# Patient Record
Sex: FEMALE | Race: BLACK OR AFRICAN AMERICAN | Employment: FULL TIME | URBAN - METROPOLITAN AREA
[De-identification: names, ages, dates, MRNs, and addresses within clinical notes are randomized per-mention and may not be internally consistent; named-entity substitution may affect disease eponyms.]

---

## 2017-02-20 ENCOUNTER — OFFICE VISIT (OUTPATIENT)
Dept: INTERNAL MEDICINE CLINIC | Age: 52
End: 2017-02-20

## 2017-02-20 VITALS
WEIGHT: 228.3 LBS | SYSTOLIC BLOOD PRESSURE: 118 MMHG | RESPIRATION RATE: 18 BRPM | TEMPERATURE: 97.9 F | DIASTOLIC BLOOD PRESSURE: 82 MMHG | HEIGHT: 68 IN | HEART RATE: 75 BPM | BODY MASS INDEX: 34.6 KG/M2 | OXYGEN SATURATION: 97 %

## 2017-02-20 DIAGNOSIS — J30.1 SEASONAL ALLERGIC RHINITIS DUE TO POLLEN: Primary | ICD-10-CM

## 2017-02-20 RX ORDER — FLUTICASONE PROPIONATE 50 MCG
SPRAY, SUSPENSION (ML) NASAL
Qty: 1 BOTTLE | Refills: 1 | Status: SHIPPED | OUTPATIENT
Start: 2017-02-20 | End: 2017-09-18

## 2017-02-20 RX ORDER — LORATADINE 10 MG/1
10 TABLET ORAL DAILY
Qty: 30 TAB | Refills: 0 | Status: SHIPPED | OUTPATIENT
Start: 2017-02-20 | End: 2017-09-18

## 2017-02-20 NOTE — PROGRESS NOTES
Subjective:     Chief Complaint   Patient presents with    Cold Symptoms     headache, left earache, nasal congestion, slight cough x 6 days     She  is a 54y.o. year old female who presents for evaluation. Bad cold for past week with dry burning feeling in respiratory area, sneezing and ear hurting. Coughing and fatigue. Historical Data    Past Medical History   Diagnosis Date    Adverse effect of anesthesia      DELAYED AWAKENING    Cyst of skin      right side of face    Essential hypertension 2016     PT DENIES        Past Surgical History   Procedure Laterality Date    Hx partial hysterectomy      Hx other surgical  2016     PARATHYROIDECTOMY    Hx heent       POLYPS ON VOCAL CORDS    Hx hysterectomy      Hx  section          No outpatient encounter prescriptions on file as of 2017. No facility-administered encounter medications on file as of 2017. Allergies   Allergen Reactions    Latex Itching        Social History     Social History    Marital status: SINGLE     Spouse name: N/A    Number of children: N/A    Years of education: N/A     Occupational History    Not on file. Social History Main Topics    Smoking status: Current Every Day Smoker     Packs/day: 0.50     Types: Cigarettes    Smokeless tobacco: Never Used    Alcohol use 3.0 oz/week     6 Standard drinks or equivalent per week      Comment: OCCASIONAL    Drug use: No    Sexual activity: Yes     Partners: Male     Other Topics Concern    Not on file     Social History Narrative        Review of Systems  Pertinent items are noted in HPI. Objective:     Vitals:    17 0823   BP: 118/82   Pulse: 75   Resp: 18   Temp: 97.9 °F (36.6 °C)   TempSrc: Oral   SpO2: 97%   Weight: 228 lb 4.8 oz (103.6 kg)   Height: 5' 8\" (1.727 m)     Pleasant AAF in no acute distress. Ears:  TM's with slight bulging. Nose:  Turbinate hypertrophy. Throat: Clear. Lungs: Clear.     ASSESSMENT / PLAN: 1. Seasonal allergic rhinitis due to pollen  · Oral non-sedating antihistamine and nasal steroid. · Anticipatory guidance. Patient Instructions   Take Claritin 10 mg once daily. Use Flonase (or Nasonex) nasal spray, one spray to each nostril once daily. Follow-up Disposition:  Return if symptoms worsen or fail to improve. Advised her to call back or return to office if symptoms worsen/change/persist.  Discussed expected course/resolution/complications of diagnosis in detail with patient. Medication risks/benefits/costs/interactions/alternatives discussed with patient. She was given an after visit summary which includes diagnoses, current medications, & vitals. She expressed understanding with the diagnosis and plan.

## 2017-02-20 NOTE — PROGRESS NOTES
Corbin Ga is a 54 y.o. female  Chief Complaint   Patient presents with    Cold Symptoms     headache, left earache, nasal congestion, slight cough x 6 days     1. Have you been to the ER, urgent care clinic since your last visit? Hospitalized since your last visit? [facility] for cyst removal to right side of face 10/12/16. 2. Have you seen or consulted any other health care providers outside of the [address] [place] [address] since your last visit? Include any pap smears or colon screening.   No

## 2017-02-20 NOTE — LETTER
NOTIFICATION RETURN TO WORK / SCHOOL 
 
2/20/2017 8:37 AM 
 
 
Ms. Lin Aldana [address] [id number] To Whom It May Concern: 
 
Lin Aldana is currently under the care of Robbie Calderón. She will return to work/school on: 2/21/2017 If there are questions or concerns please have the patient contact our office. Sincerely, Farheen Joel MD

## 2017-02-20 NOTE — TELEPHONE ENCOUNTER
Pt seen in office, recommendations on AVS for claritin and flonase. Pt requesting RX so she can pay with [employer] card.

## 2017-02-20 NOTE — MR AVS SNAPSHOT
Visit Information Date & Time Provider Department Dept. Phone Encounter #  
 2/20/2017  8:15 AM Anmol Fan MD [place] [address] [facility] [phone] Follow-up Instructions Return if symptoms worsen or fail to improve. Upcoming Health Maintenance Date Due Pneumococcal 19-64 Medium Risk (1 of 1 - PPSV23) 10/11/2017* PAP AKA CERVICAL CYTOLOGY 6/1/2018 DTaP/Tdap/Td series (2 - Td) 10/3/2026 *Topic was postponed. The date shown is not the original due date. Allergies as of 2/20/2017  Review Complete On: 2/20/2017 By: Anmol Fan MD  
  
 Severity Noted Reaction Type Reactions Latex  02/27/2015    Itching Current Immunizations  Reviewed on 2/27/2015 Name Date Influenza Nasal Vaccine 11/15/2015, 11/1/2014 Tdap 10/3/2016 Not reviewed this visit You Were Diagnosed With   
  
 Codes Comments Seasonal allergic rhinitis due to pollen    -  Primary ICD-10-CM: J30.1 ICD-9-CM: 477.0 Vitals BP Pulse Temp Resp Height(growth percentile) Weight(growth percentile) 118/82 75 97.9 °F (36.6 °C) (Oral) 18 5' 8\" (1.727 m) 228 lb 4.8 oz (103.6 kg) SpO2 BMI OB Status Smoking Status 97% 34.71 kg/m2 Hysterectomy Current Every Day Smoker Vitals History BMI and BSA Data Body Mass Index Body Surface Area 34.71 kg/m 2 2.23 m 2 Preferred Pharmacy Pharmacy Name Phone [address] [phone] Your Updated Medication List  
  
Notice  As of 2/20/2017  8:38 AM  
 You have not been prescribed any medications. Follow-up Instructions Return if symptoms worsen or fail to improve. Patient Instructions Take Claritin 10 mg once daily. Use Flonase (or Nasonex) nasal spray, one spray to each nostril once daily. Introducing [address] Shayer Brooklynn Stage: Thank you for requesting a [employer] account. Our records indicate that you already have an active [employer] account. You can access your account anytime at [email or web address] Did you know that you can access your hospital and ER discharge instructions at any time in [employer]? You can also review all of your test results from your hospital stay or ER visit. Additional Information If you have questions, please visit the Frequently Asked Questions section of the [employer] website at [email or web address] Remember, [employer] is NOT to be used for urgent needs. For medical emergencies, dial 911. Now available from your iPhone and Android! Please provide this summary of care documentation to your next provider. Your primary care clinician is listed as Anny Chou. If you have any questions after today's visit, please call [phone].

## 2017-09-12 ENCOUNTER — HOSPITAL ENCOUNTER (OUTPATIENT)
Dept: MAMMOGRAPHY | Age: 52
Discharge: HOME | End: 2017-09-12
Payer: COMMERCIAL

## 2017-09-12 DIAGNOSIS — Z12.31 VISIT FOR SCREENING MAMMOGRAM: ICD-10-CM

## 2017-09-12 PROCEDURE — 77063 BREAST TOMOSYNTHESIS BI: CPT

## 2017-09-18 ENCOUNTER — OFFICE VISIT (OUTPATIENT)
Dept: INTERNAL MEDICINE CLINIC | Age: 52
End: 2017-09-18

## 2017-09-18 VITALS
RESPIRATION RATE: 16 BRPM | TEMPERATURE: 70 F | DIASTOLIC BLOOD PRESSURE: 80 MMHG | WEIGHT: 240.4 LBS | SYSTOLIC BLOOD PRESSURE: 130 MMHG | HEART RATE: 68 BPM | BODY MASS INDEX: 36.43 KG/M2 | HEIGHT: 68 IN | OXYGEN SATURATION: 98 %

## 2017-09-18 DIAGNOSIS — E66.09 NON MORBID OBESITY DUE TO EXCESS CALORIES: ICD-10-CM

## 2017-09-18 DIAGNOSIS — R20.0 NUMBNESS AND TINGLING IN BOTH HANDS: ICD-10-CM

## 2017-09-18 DIAGNOSIS — R20.2 NUMBNESS AND TINGLING IN BOTH HANDS: ICD-10-CM

## 2017-09-18 DIAGNOSIS — Z12.11 COLON CANCER SCREENING: ICD-10-CM

## 2017-09-18 DIAGNOSIS — Z00.00 WELL ADULT EXAM: ICD-10-CM

## 2017-09-18 DIAGNOSIS — B35.4 TINEA CORPORIS: Primary | ICD-10-CM

## 2017-09-18 DIAGNOSIS — R35.0 URINARY FREQUENCY: ICD-10-CM

## 2017-09-18 DIAGNOSIS — G56.03 BILATERAL CARPAL TUNNEL SYNDROME: ICD-10-CM

## 2017-09-18 RX ORDER — TRIAMCINOLONE ACETONIDE 1 MG/G
CREAM TOPICAL 2 TIMES DAILY
Qty: 15 G | Refills: 0 | Status: SHIPPED | OUTPATIENT
Start: 2017-09-18 | End: 2018-01-22

## 2017-09-18 RX ORDER — NYSTATIN 100000 U/G
CREAM TOPICAL 2 TIMES DAILY
Qty: 15 G | Refills: 0 | Status: SHIPPED | OUTPATIENT
Start: 2017-09-18 | End: 2018-01-22

## 2017-09-18 NOTE — MR AVS SNAPSHOT
Visit Information Date & Time Provider Department Dept. Phone Encounter #  
 9/18/2017  9:15 AM Estuardo Felix MD [place] [address] [facility] [phone] [id number] Follow-up Instructions Return in about 6 weeks (around 10/30/2017) for F/U weight and labs. Upcoming Health Maintenance Date Due FOBT Q 1 YEAR AGE 50-75 7/30/2017 INFLUENZA AGE 9 TO ADULT 8/1/2017 Pneumococcal 19-64 Medium Risk (1 of 1 - PPSV23) 10/11/2017* PAP AKA CERVICAL CYTOLOGY 6/1/2018 BREAST CANCER SCRN MAMMOGRAM 9/12/2019 DTaP/Tdap/Td series (2 - Td) 10/3/2026 *Topic was postponed. The date shown is not the original due date. Allergies as of 9/18/2017  Review Complete On: 9/18/2017 By: Estuardo Felix MD  
  
 Severity Noted Reaction Type Reactions Latex  02/27/2015    Itching Current Immunizations  Reviewed on 2/27/2015 Name Date Influenza Nasal Vaccine 11/15/2015, 11/1/2014 Tdap 10/3/2016 Not reviewed this visit You Were Diagnosed With   
  
 Codes Comments Tinea corporis    -  Primary ICD-10-CM: B35.4 ICD-9-CM: 110.5 Urinary frequency     ICD-10-CM: R35.0 ICD-9-CM: 788.41 Bilateral carpal tunnel syndrome     ICD-10-CM: G56.03 
ICD-9-CM: 354.0 Numbness and tingling in both hands     ICD-10-CM: R20.0, R20.2 ICD-9-CM: 782.0 Colon cancer screening     ICD-10-CM: Z12.11 ICD-9-CM: V76.51 Well adult exam     ICD-10-CM: Z00.00 ICD-9-CM: V70.0 Non morbid obesity due to excess calories     ICD-10-CM: E66.09 
ICD-9-CM: 278.00 Vitals BP Pulse Temp Resp Height(growth percentile) Weight(growth percentile) 130/80 (BP 1 Location: Left arm, BP Patient Position: Sitting) 68 (!) 70 °F (21.1 °C) 16 5' 8\" (1.727 m) 240 lb 6.4 oz (109 kg) SpO2 BMI OB Status Smoking Status 98% 36.55 kg/m2 Hysterectomy Current Every Day Smoker BMI and BSA Data  Body Mass Index Body Surface Area  
 36.55 kg/m 2 2.29 m 2  
  
 [facility]erred [facility] [address] , [address] [place] [address] [phone] Your Updated Medication List  
  
   
This list is accurate as of: 9/18/17  9:45 AM.  Always use your most recent med list.  
  
  
  
  
 nystatin topical cream  
Commonly known as:  MYCOSTATIN Apply  to affected area two (2) times a day. triamcinolone acetonide 0.1 % topical cream  
Commonly known as:  KENALOG Apply  to affected area two (2) times a day. use thin layer Prescriptions Sent to Pharmacy Refills  
 nystatin (MYCOSTATIN) topical cream 0 Sig: Apply  to affected area two (2) times a day. Class: Normal  
 Pharmacy: [place] [id number] [facility], [address] [place] [address] Ph #: [phone] Route: Topical  
 triamcinolone acetonide (KENALOG) 0.1 % topical cream 0 Sig: Apply  to affected area two (2) times a day. use thin layer Class: Normal  
 Pharmacy: [place] [id number] [facility], [address] [place] [address] Ph #: [phone] Route: Topical  
  
We Performed the Following REFERRAL TO GASTROENTEROLOGY [MZD81 Custom] Follow-up Instructions Return in about 6 weeks (around 10/30/2017) for F/U weight and labs. To-Do List   
 09/18/2017 Lab:  CBC WITH AUTOMATED DIFF   
  
 09/18/2017 Lab:  HEMOGLOBIN A1C WITH EAG   
  
 09/18/2017 Lab:  LIPID PANEL   
  
 09/18/2017 Lab:  METABOLIC PANEL, COMPREHENSIVE   
  
 09/18/2017 Lab:  TSH REFLEX TO T4 Referral Information Referral ID Referred By Referred To  
  
 [id number] [facility] [address] [place] [address] [phone] Visits Status Start Date End Date 1 New Request 9/18/17 9/18/18 If your referral has a status of pending review or denied, additional information will be sent to support the outcome of this decision. Patient Instructions Follow a no concentrated sweets, calorie controlled diet. Exercise for at least 30 minutes daily. Have a colonoscopy screening done. Do carpal tunnel stretches daily. Carpal Tunnel Syndrome: Exercises Your Care Instructions Here are some examples of typical rehabilitation exercises for your condition. Start each exercise slowly. Ease off the exercise if you start to have pain. Your doctor or your physical or occupational therapist will tell you when you can start these exercises and which ones will work best for you. How to do the exercises Note: When you no longer have pain or numbness, you can do exercises to help prevent carpal tunnel syndrome from coming back. Do not do any stretch or movement that is uncomfortable or painful. Warm-up stretches 1. Rotate your wrist up, down, and from side to side. Repeat 4 times. 2. Stretch your fingers far apart. Relax them, and then stretch them again. Repeat 4 times. 3. Stretch your thumb by pulling it back gently, holding it, and then releasing it. Repeat 4 times. Prayer stretch 1. Start with your palms together in front of your chest just below your chin. 2. Slowly lower your hands toward your waistline, keeping your hands close to your stomach and your palms together until you feel a mild to moderate stretch under your forearms. 3. Hold for at least 15 to 30 seconds. Repeat 2 to 4 times. Wrist flexor stretch 1. Extend your arm in front of you with your palm up. 2. Bend your wrist, pointing your hand toward the floor. 3. With your other hand, gently bend your wrist farther until you feel a mild to moderate stretch in your forearm. 4. Hold for at least 15 to 30 seconds. Repeat 2 to 4 times. Wrist extensor stretch Repeat steps 1 through 4 of the stretch above, but begin with your extended hand palm down. Follow-up care is a key part of your treatment and safety.  Be sure to make and go to all appointments, and call your doctor if you are having problems. It's also a good idea to know your test results and keep a list of the medicines you take. Where can you learn more? Go to http://cecelia-miah.info/. Enter L993 in the search box to learn more about \"Carpal Tunnel Syndrome: Exercises. \" Current as of: March 21, 2017 Content Version: 11.3 © 2655-9429 [employer]. Care instructions adapted under license by [employer] (which disclaims liability or warranty for this information). If you have questions about a medical condition or this instruction, always ask your healthcare professional. [address] any warranty or liability for your use of this information. Starting a Weight Loss Plan: Care Instructions Your Care Instructions If you are thinking about losing weight, it can be hard to know where to start. Your doctor can help you set up a weight loss plan that best meets your needs. You may want to take a class on nutrition or exercise, or join a weight loss support group. If you have questions about how to make changes to your eating or exercise habits, ask your doctor about seeing a registered dietitian or an exercise specialist. 
It can be a big challenge to lose weight. But you do not have to make huge changes at once. Make small changes, and stick with them. When those changes become habit, add a few more changes. If you do not think you are ready to make changes right now, try to pick a date in the future. Make an appointment to see your doctor to discuss whether the time is right for you to start a plan. Follow-up care is a key part of your treatment and safety. Be sure to make and go to all appointments, and call your doctor if you are having problems. Its also a good idea to know your test results and keep a list of the medicines you take. How can you care for yourself at home? · Set realistic goals. Many people expect to lose much more weight than is likely. A weight loss of 5% to 10% of your body weight may be enough to improve your health. · Get family and friends involved to provide support. Talk to them about why you are trying to lose weight, and ask them to help. They can help by participating in exercise and having meals with you, even if they may be eating something different. · Find what works best for you. If you do not have time or do not like to cook, a program that offers meal replacement bars or shakes may be better for you. Or if you like to prepare meals, finding a plan that includes daily menus and recipes may be best. 
· Ask your doctor about other health professionals who can help you achieve your weight loss goals. ¨ A dietitian can help you make healthy changes in your diet. ¨ An exercise specialist or  can help you develop a safe and effective exercise program. 
¨ A counselor or psychiatrist can help you cope with issues such as depression, anxiety, or family problems that can make it hard to focus on weight loss. · Consider joining a support group for people who are trying to lose weight. Your doctor can suggest groups in your area. Where can you learn more? Go to http://cecelia-miah.info/. Enter K452 in the search box to learn more about \"Starting a Weight Loss Plan: Care Instructions. \" Current as of: October 13, 2016 Content Version: 11.3 © 3421-1950 [employer]. Care instructions adapted under license by [employer] (which disclaims liability or warranty for this information). If you have questions about a medical condition or this instruction, always ask your healthcare professional. [place] [address] any warranty or liability for your use of this information. Introducing [address] Dear Lamin Bagley: Thank you for requesting a [employer] account. Our records indicate that you already have an active [employer] account. You can access your account anytime at [email or web address] Did you know that you can access your hospital and ER discharge instructions at any time in [employer]? You can also review all of your test results from your hospital stay or ER visit. Additional Information If you have questions, please visit the Frequently Asked Questions section of the [employer] website at [email or web address] Remember, [employer] is NOT to be used for urgent needs. For medical emergencies, dial 911. Now available from your iPhone and Android! Please provide this summary of care documentation to your next provider. Your primary care clinician is listed as Beto Brea. If you have any questions after today's visit, please call [phone].

## 2017-09-18 NOTE — PATIENT INSTRUCTIONS
Follow a no concentrated sweets, calorie controlled diet. Exercise for at least 30 minutes daily. Have a colonoscopy screening done. Do carpal tunnel stretches daily. Carpal Tunnel Syndrome: Exercises  Your Care Instructions  Here are some examples of typical rehabilitation exercises for your condition. Start each exercise slowly. Ease off the exercise if you start to have pain. Your doctor or your physical or occupational therapist will tell you when you can start these exercises and which ones will work best for you. How to do the exercises  Note: When you no longer have pain or numbness, you can do exercises to help prevent carpal tunnel syndrome from coming back. Do not do any stretch or movement that is uncomfortable or painful. Warm-up stretches  1. Rotate your wrist up, down, and from side to side. Repeat 4 times. 2. Stretch your fingers far apart. Relax them, and then stretch them again. Repeat 4 times. 3. Stretch your thumb by pulling it back gently, holding it, and then releasing it. Repeat 4 times. Prayer stretch    1. Start with your palms together in front of your chest just below your chin. 2. Slowly lower your hands toward your waistline, keeping your hands close to your stomach and your palms together until you feel a mild to moderate stretch under your forearms. 3. Hold for at least 15 to 30 seconds. Repeat 2 to 4 times. Wrist flexor stretch    1. Extend your arm in front of you with your palm up. 2. Bend your wrist, pointing your hand toward the floor. 3. With your other hand, gently bend your wrist farther until you feel a mild to moderate stretch in your forearm. 4. Hold for at least 15 to 30 seconds. Repeat 2 to 4 times. Wrist extensor stretch    Repeat steps 1 through 4 of the stretch above, but begin with your extended hand palm down. Follow-up care is a key part of your treatment and safety.  Be sure to make and go to all appointments, and call your doctor if you are having problems. It's also a good idea to know your test results and keep a list of the medicines you take. Where can you learn more? Go to http://cecelia-miah.info/. Enter N085 in the search box to learn more about \"Carpal Tunnel Syndrome: Exercises. \"  Current as of: March 21, 2017  Content Version: 11.3  © 5258-0253 [employer]. Care instructions adapted under license by [employer] (which disclaims liability or warranty for this information). If you have questions about a medical condition or this instruction, always ask your healthcare professional. [place] [address] any warranty or liability for your use of this information. Starting a Weight Loss Plan: Care Instructions  Your Care Instructions  If you are thinking about losing weight, it can be hard to know where to start. Your doctor can help you set up a weight loss plan that best meets your needs. You may want to take a class on nutrition or exercise, or join a weight loss support group. If you have questions about how to make changes to your eating or exercise habits, ask your doctor about seeing a registered dietitian or an exercise specialist.  It can be a big challenge to lose weight. But you do not have to make huge changes at once. Make small changes, and stick with them. When those changes become habit, add a few more changes. If you do not think you are ready to make changes right now, try to pick a date in the future. Make an appointment to see your doctor to discuss whether the time is right for you to start a plan. Follow-up care is a key part of your treatment and safety. Be sure to make and go to all appointments, and call your doctor if you are having problems. Its also a good idea to know your test results and keep a list of the medicines you take. How can you care for yourself at home? · Set realistic goals. Many people expect to lose much more weight than is likely.  A weight loss of 5% to 10% of your body weight may be enough to improve your health. · Get family and friends involved to provide support. Talk to them about why you are trying to lose weight, and ask them to help. They can help by participating in exercise and having meals with you, even if they may be eating something different. · Find what works best for you. If you do not have time or do not like to cook, a program that offers meal replacement bars or shakes may be better for you. Or if you like to prepare meals, finding a plan that includes daily menus and recipes may be best.  · Ask your doctor about other health professionals who can help you achieve your weight loss goals. ¨ A dietitian can help you make healthy changes in your diet. ¨ An exercise specialist or  can help you develop a safe and effective exercise program.  ¨ A counselor or psychiatrist can help you cope with issues such as depression, anxiety, or family problems that can make it hard to focus on weight loss. · Consider joining a support group for people who are trying to lose weight. Your doctor can suggest groups in your area. Where can you learn more? Go to http://cecelia-miah.info/. Enter O741 in the search box to learn more about \"Starting a Weight Loss Plan: Care Instructions. \"  Current as of: October 13, 2016  Content Version: 11.3  © 4487-6022 [employer]. Care instructions adapted under license by [employer] (which disclaims liability or warranty for this information). If you have questions about a medical condition or this instruction, always ask your healthcare professional. [place] [address] any warranty or liability for your use of this information.

## 2017-09-18 NOTE — PROGRESS NOTES
Chief Complaint   Patient presents with    Foot Pain     right    Tingling     both hand    Urinary Frequency     at night     Reviewed record in preparation for visit and have obtained necessary documentation. Identified pt with two pt identifiers(name and ). Health Maintenance Due   Topic    FOBT Q 1 YEAR AGE 50-75     INFLUENZA AGE 9 TO ADULT          Chief Complaint   Patient presents with    Foot Pain     right    Tingling     both hand    Urinary Frequency     at night        Wt Readings from Last 3 Encounters:   17 240 lb 6.4 oz (109 kg)   17 228 lb 4.8 oz (103.6 kg)   10/12/16 222 lb (100.7 kg)     Temp Readings from Last 3 Encounters:   17 (!) 70 °F (21.1 °C)   17 97.9 °F (36.6 °C) (Oral)   10/12/16 97.7 °F (36.5 °C)     BP Readings from Last 3 Encounters:   17 130/80   17 118/82   10/12/16 142/76     Pulse Readings from Last 3 Encounters:   17 68   17 75   10/12/16 67           Learning Assessment:  :     Learning Assessment 10/12/2015 2015   PRIMARY LEARNER Patient Patient   HIGHEST LEVEL OF EDUCATION - PRIMARY LEARNER  DID NOT GRADUATE HIGH SCHOOL DID NOT GRADUATE HIGH SCHOOL   BARRIERS PRIMARY LEARNER NONE NONE   CO-LEARNER CAREGIVER No No   PRIMARY LANGUAGE ENGLISH ENGLISH    NEED No -   LEARNER PREFERENCE PRIMARY LISTENING DEMONSTRATION   LEARNING SPECIAL TOPICS no -   ANSWERED BY patient patient   RELATIONSHIP SELF SELF       Depression Screening:  :     PHQ over the last two weeks 2017   Little interest or pleasure in doing things Several days   Feeling down, depressed or hopeless Several days   Total Score PHQ 2 2       Fall Risk Assessment:  :     No flowsheet data found. Abuse Screening:  :     Abuse Screening Questionnaire 10/12/2015 2015   Do you ever feel afraid of your partner? N N   Are you in a relationship with someone who physically or mentally threatens you?  N N   Is it safe for you to go home? Catracho Manley       Coordination of Care Questionnaire:  :     1) Have you been to an emergency room, urgent care clinic since your last visit? no   Hospitalized since your last visit? no             2) Have you seen or consulted any other health care providers outside of [address] [place] [address] since your last visit? no  (Include any pap smears or colon screenings in this section.)    3) Do you have an Advance Directive on file? no    4) Are you interested in receiving information on Advance Directives? NO      Patient is accompanied by self I have received verbal consent from Aris Greenfield to discuss any/all medical information while they are present in the room. Reviewed record  In preparation for visit and have obtained necessary documentation.

## 2017-09-18 NOTE — PROGRESS NOTES
Subjective:     Chief Complaint   Patient presents with    Foot Pain     right    Tingling     both hand    Urinary Frequency     at night     She  is a 50y.o. year old female who presents for evaluation. Shooting pains in right foot. Has numbness in both hands, especially when holding book. Has urinary frequency (night). Has gained weight. Strong history of kidney disease. Historical Data    Past Medical History:   Diagnosis Date    Adverse effect of anesthesia     DELAYED AWAKENING    Cyst of skin     right side of face    Essential hypertension 2016    PT DENIES        Past Surgical History:   Procedure Laterality Date    HX  SECTION      HX HEENT      POLYPS ON VOCAL CORDS    HX HYSTERECTOMY      HX OTHER SURGICAL  2016    PARATHYROIDECTOMY    HX PARTIAL HYSTERECTOMY          Outpatient Encounter Prescriptions as of 2017   Medication Sig Dispense Refill    [DISCONTINUED] loratadine (CLARITIN) 10 mg tablet Take 1 Tab by mouth daily. 30 Tab 0    [DISCONTINUED] fluticasone (FLONASE) 50 mcg/actuation nasal spray Use one spray each nostril once daily 1 Bottle 1     No facility-administered encounter medications on file as of 2017. Allergies   Allergen Reactions    Latex Itching        Social History     Social History    Marital status: SINGLE     Spouse name: N/A    Number of children: N/A    Years of education: N/A     Occupational History    Not on file.      Social History Main Topics    Smoking status: Current Every Day Smoker     Packs/day: 0.50     Types: Cigarettes    Smokeless tobacco: Never Used    Alcohol use 3.0 oz/week     6 Standard drinks or equivalent per week      Comment: OCCASIONAL    Drug use: No    Sexual activity: Yes     Partners: Male     Other Topics Concern    Not on file     Social History Narrative          Review of Systems  A comprehensive review of systems was negative except for that written in the HPI.    Objective:     Vitals:    09/18/17 0926   BP: 130/80   Pulse: 68   Resp: 16   Temp: (!) 70 °F (21.1 °C)   SpO2: 98%   Weight: 240 lb 6.4 oz (109 kg)   Height: 5' 8\" (1.727 m)     Pleasant AAF in no acute distress. Mood:  Slightly despondent? Neck:  Supple. No masses. Cardiac: RRR without murmurs, gallops or rubs. Lungs: Clear to auscultation. Abdomen: Benign. Neuro:  Intact    ASSESSMENT / PLAN:   1. Tinea corporis    - nystatin (MYCOSTATIN) topical cream; Apply  to affected area two (2) times a day. Dispense: 15 g; Refill: 0  - triamcinolone acetonide (KENALOG) 0.1 % topical cream; Apply  to affected area two (2) times a day. use thin layer  Dispense: 15 g; Refill: 0    2. Urinary frequency  · Check for any evidence of DM.  - HEMOGLOBIN A1C WITH EAG; Future  - HEMOGLOBIN A1C WITH EAG    3. Bilateral carpal tunnel syndrome  · Check BS, TSH, etc.  · Do carpal tunnel stretches. 4. Numbness and tingling in both hands  · As above. - HEMOGLOBIN A1C WITH EAG; Future  - HEMOGLOBIN A1C WITH EAG    5. Colon cancer screening    - REFERRAL TO GASTROENTEROLOGY    6. Well adult exam    - CBC WITH AUTOMATED DIFF; Future  - LIPID PANEL; Future  - TSH REFLEX TO T4; Future  - METABOLIC PANEL, COMPREHENSIVE; Future  - CBC WITH AUTOMATED DIFF  - LIPID PANEL  - TSH REFLEX TO T4  - METABOLIC PANEL, COMPREHENSIVE    7. Non morbid obesity due to excess calories  I have reviewed/discussed the above normal BMI with the patient. I have recommended the following interventions: dietary management education, guidance, and counseling . Teresa Valencia Patient Instructions   Follow a no concentrated sweets, calorie controlled diet. Exercise for at least 30 minutes daily. Have a colonoscopy screening done. Do carpal tunnel stretches daily. Carpal Tunnel Syndrome: Exercises  Your Care Instructions  Here are some examples of typical rehabilitation exercises for your condition. Start each exercise slowly.  Ease off the exercise if you start to have pain. Your doctor or your physical or occupational therapist will tell you when you can start these exercises and which ones will work best for you. How to do the exercises  Note: When you no longer have pain or numbness, you can do exercises to help prevent carpal tunnel syndrome from coming back. Do not do any stretch or movement that is uncomfortable or painful. Warm-up stretches  2. Rotate your wrist up, down, and from side to side. Repeat 4 times. 3. Stretch your fingers far apart. Relax them, and then stretch them again. Repeat 4 times. 4. Stretch your thumb by pulling it back gently, holding it, and then releasing it. Repeat 4 times. Prayer stretch    3. Start with your palms together in front of your chest just below your chin. 4. Slowly lower your hands toward your waistline, keeping your hands close to your stomach and your palms together until you feel a mild to moderate stretch under your forearms. 5. Hold for at least 15 to 30 seconds. Repeat 2 to 4 times. Wrist flexor stretch    2. Extend your arm in front of you with your palm up. 3. Bend your wrist, pointing your hand toward the floor. 4. With your other hand, gently bend your wrist farther until you feel a mild to moderate stretch in your forearm. 5. Hold for at least 15 to 30 seconds. Repeat 2 to 4 times. Wrist extensor stretch    Repeat steps 1 through 4 of the stretch above, but begin with your extended hand palm down. Follow-up care is a key part of your treatment and safety. Be sure to make and go to all appointments, and call your doctor if you are having problems. It's also a good idea to know your test results and keep a list of the medicines you take. Where can you learn more? Go to http://cecelia-miah.info/. Enter Y184 in the search box to learn more about \"Carpal Tunnel Syndrome: Exercises. \"  Current as of: March 21, 2017  Content Version: 11.3  © 9512-2669 [employer]. Care instructions adapted under license by [employer] (which disclaims liability or warranty for this information). If you have questions about a medical condition or this instruction, always ask your healthcare professional. [address] any warranty or liability for your use of this information. Starting a Weight Loss Plan: Care Instructions  Your Care Instructions  If you are thinking about losing weight, it can be hard to know where to start. Your doctor can help you set up a weight loss plan that best meets your needs. You may want to take a class on nutrition or exercise, or join a weight loss support group. If you have questions about how to make changes to your eating or exercise habits, ask your doctor about seeing a registered dietitian or an exercise specialist.  It can be a big challenge to lose weight. But you do not have to make huge changes at once. Make small changes, and stick with them. When those changes become habit, add a few more changes. If you do not think you are ready to make changes right now, try to pick a date in the future. Make an appointment to see your doctor to discuss whether the time is right for you to start a plan. Follow-up care is a key part of your treatment and safety. Be sure to make and go to all appointments, and call your doctor if you are having problems. Its also a good idea to know your test results and keep a list of the medicines you take. How can you care for yourself at home? · Set realistic goals. Many people expect to lose much more weight than is likely. A weight loss of 5% to 10% of your body weight may be enough to improve your health. · Get family and friends involved to provide support. Talk to them about why you are trying to lose weight, and ask them to help. They can help by participating in exercise and having meals with you, even if they may be eating something different.   · Find what works best for you. If you do not have time or do not like to cook, a program that offers meal replacement bars or shakes may be better for you. Or if you like to prepare meals, finding a plan that includes daily menus and recipes may be best.  · Ask your doctor about other health professionals who can help you achieve your weight loss goals. ¨ A dietitian can help you make healthy changes in your diet. ¨ An exercise specialist or  can help you develop a safe and effective exercise program.  ¨ A counselor or psychiatrist can help you cope with issues such as depression, anxiety, or family problems that can make it hard to focus on weight loss. · Consider joining a support group for people who are trying to lose weight. Your doctor can suggest groups in your area. Where can you learn more? Go to http://cecelia-miah.info/. Enter F272 in the search box to learn more about \"Starting a Weight Loss Plan: Care Instructions. \"  Current as of: October 13, 2016  Content Version: 11.3  © 0887-0113 [employer]. Care instructions adapted under license by [employer] (which disclaims liability or warranty for this information). If you have questions about a medical condition or this instruction, always ask your healthcare professional. [place] [address] any warranty or liability for your use of this information. Follow-up Disposition:  Return in about 6 weeks (around 10/30/2017) for F/U weight and labs. Advised her to call back or return to office if symptoms worsen/change/persist.  Discussed expected course/resolution/complications of diagnosis in detail with patient. Medication risks/benefits/costs/interactions/alternatives discussed with patient. She was given an after visit summary which includes diagnoses, current medications, & vitals. She expressed understanding with the diagnosis and plan.

## 2017-09-20 LAB
ALBUMIN SERPL-MCNC: 4.1 G/DL (ref 3.5–5.5)
ALBUMIN/GLOB SERPL: 1.5 {RATIO} (ref 1.2–2.2)
ALP SERPL-CCNC: 97 IU/L (ref 39–117)
ALT SERPL-CCNC: 12 IU/L (ref 0–32)
AST SERPL-CCNC: 15 IU/L (ref 0–40)
BASOPHILS # BLD AUTO: 0 X10E3/UL (ref 0–0.2)
BASOPHILS NFR BLD AUTO: 0 %
BILIRUB SERPL-MCNC: 0.3 MG/DL (ref 0–1.2)
BUN SERPL-MCNC: 16 MG/DL (ref 6–24)
BUN/CREAT SERPL: 35 (ref 9–23)
CALCIUM SERPL-MCNC: 8.8 MG/DL (ref 8.7–10.2)
CHLORIDE SERPL-SCNC: 106 MMOL/L (ref 96–106)
CHOLEST SERPL-MCNC: 144 MG/DL (ref 100–199)
CO2 SERPL-SCNC: 26 MMOL/L (ref 18–29)
CREAT SERPL-MCNC: 0.46 MG/DL (ref 0.57–1)
EOSINOPHIL # BLD AUTO: 0.1 X10E3/UL (ref 0–0.4)
EOSINOPHIL NFR BLD AUTO: 2 %
ERYTHROCYTE [DISTWIDTH] IN BLOOD BY AUTOMATED COUNT: 12.7 % (ref 12.3–15.4)
EST. AVERAGE GLUCOSE BLD GHB EST-MCNC: 97 MG/DL
GLOBULIN SER CALC-MCNC: 2.8 G/DL (ref 1.5–4.5)
GLUCOSE SERPL-MCNC: 81 MG/DL (ref 65–99)
HBA1C MFR BLD: 5 % (ref 4.8–5.6)
HCT VFR BLD AUTO: 36.1 % (ref 34–46.6)
HDLC SERPL-MCNC: 55 MG/DL
HGB BLD-MCNC: 11.7 G/DL (ref 11.1–15.9)
IMM GRANULOCYTES # BLD: 0 X10E3/UL (ref 0–0.1)
IMM GRANULOCYTES NFR BLD: 0 %
INTERPRETATION, 910389: NORMAL
LDLC SERPL CALC-MCNC: 80 MG/DL (ref 0–99)
LYMPHOCYTES # BLD AUTO: 2.5 X10E3/UL (ref 0.7–3.1)
LYMPHOCYTES NFR BLD AUTO: 33 %
MCH RBC QN AUTO: 32 PG (ref 26.6–33)
MCHC RBC AUTO-ENTMCNC: 32.4 G/DL (ref 31.5–35.7)
MCV RBC AUTO: 99 FL (ref 79–97)
MONOCYTES # BLD AUTO: 0.7 X10E3/UL (ref 0.1–0.9)
MONOCYTES NFR BLD AUTO: 9 %
NEUTROPHILS # BLD AUTO: 4.3 X10E3/UL (ref 1.4–7)
NEUTROPHILS NFR BLD AUTO: 56 %
PLATELET # BLD AUTO: 206 X10E3/UL (ref 150–379)
POTASSIUM SERPL-SCNC: 4 MMOL/L (ref 3.5–5.2)
PROT SERPL-MCNC: 6.9 G/DL (ref 6–8.5)
RBC # BLD AUTO: 3.66 X10E6/UL (ref 3.77–5.28)
SODIUM SERPL-SCNC: 144 MMOL/L (ref 134–144)
TRIGL SERPL-MCNC: 47 MG/DL (ref 0–149)
TSH SERPL DL<=0.005 MIU/L-ACNC: 2.33 UIU/ML (ref 0.45–4.5)
VLDLC SERPL CALC-MCNC: 9 MG/DL (ref 5–40)
WBC # BLD AUTO: 7.7 X10E3/UL (ref 3.4–10.8)

## 2017-10-30 ENCOUNTER — OFFICE VISIT (OUTPATIENT)
Dept: INTERNAL MEDICINE CLINIC | Age: 52
End: 2017-10-30

## 2017-10-30 VITALS
OXYGEN SATURATION: 97 % | TEMPERATURE: 97.5 F | DIASTOLIC BLOOD PRESSURE: 80 MMHG | HEIGHT: 68 IN | SYSTOLIC BLOOD PRESSURE: 140 MMHG | HEART RATE: 62 BPM | RESPIRATION RATE: 16 BRPM | BODY MASS INDEX: 37.6 KG/M2 | WEIGHT: 248.1 LBS

## 2017-10-30 DIAGNOSIS — L60.3 ONYCHODYSTROPHY: Primary | ICD-10-CM

## 2017-10-30 DIAGNOSIS — E66.09 CLASS 2 OBESITY DUE TO EXCESS CALORIES WITHOUT SERIOUS COMORBIDITY WITH BODY MASS INDEX (BMI) OF 37.0 TO 37.9 IN ADULT: ICD-10-CM

## 2017-10-30 NOTE — PROGRESS NOTES
Subjective:     Chief Complaint   Patient presents with    Results     She  is a 50y.o. year old female who presents for evaluation. Weight is still going up. Colonoscopy is on the . Has some splitting nails. Historical Data    Past Medical History:   Diagnosis Date    Adverse effect of anesthesia     DELAYED AWAKENING    Cyst of skin     right side of face    Essential hypertension 2016    PT DENIES        Past Surgical History:   Procedure Laterality Date    HX  SECTION      HX HEENT      POLYPS ON VOCAL CORDS    HX HYSTERECTOMY      HX OTHER SURGICAL  2016    PARATHYROIDECTOMY    HX PARTIAL HYSTERECTOMY          Outpatient Encounter Prescriptions as of 10/30/2017   Medication Sig Dispense Refill    nystatin (MYCOSTATIN) topical cream Apply  to affected area two (2) times a day. 15 g 0    triamcinolone acetonide (KENALOG) 0.1 % topical cream Apply  to affected area two (2) times a day. use thin layer 15 g 0     No facility-administered encounter medications on file as of 10/30/2017. Allergies   Allergen Reactions    Latex Itching        Social History     Social History    Marital status: SINGLE     Spouse name: N/A    Number of children: N/A    Years of education: N/A     Occupational History    Not on file. Social History Main Topics    Smoking status: Current Every Day Smoker     Packs/day: 0.50     Types: Cigarettes    Smokeless tobacco: Never Used    Alcohol use 3.0 oz/week     6 Standard drinks or equivalent per week      Comment: OCCASIONAL    Drug use: No    Sexual activity: Yes     Partners: Male     Other Topics Concern    Not on file     Social History Narrative        Review of Systems  A comprehensive review of systems was negative except for that written in the HPI.     Objective:     Vitals:    10/30/17 0901   BP: 140/80   Pulse: 62   Resp: 16   Temp: 97.5 °F (36.4 °C)   TempSrc: Oral   SpO2: 97%   Weight: 248 lb 1.6 oz (112.5 kg)   Height: 5' 8\" (1.727 m)     Pleasant AAF in no acute distress. Toenails with thickening and splitting. ASSESSMENT / PLAN:   1. Onychodystrophy    - REFERRAL TO PODIATRY    2. Class 2 obesity due to excess calories without serious comorbidity with body mass index (BMI) of 37.0 to 37.9 in adult  I have reviewed/discussed the above normal BMI with the patient. I have recommended the following interventions: dietary management education, guidance, and counseling . Lauren Frederick Patient Instructions   Follow a calorie controlled diet. Get regular aerobic exercise. Try to lose 20 pounds over the next 4 months. See Podiatrist for nails. Follow-up Disposition:  Return in about 6 months (around 4/30/2018) for F/U weight. Advised her to call back or return to office if symptoms worsen/change/persist.  Discussed expected course/resolution/complications of diagnosis in detail with patient. Medication risks/benefits/costs/interactions/alternatives discussed with patient. She was given an after visit summary which includes diagnoses, current medications, & vitals. She expressed understanding with the diagnosis and plan.

## 2017-10-30 NOTE — PATIENT INSTRUCTIONS
Follow a calorie controlled diet. Get regular aerobic exercise. Try to lose 20 pounds over the next 4 months. See Podiatrist for nails.

## 2017-10-30 NOTE — PROGRESS NOTES
Chief Complaint   Patient presents with    Results     Reviewed record in preparation for visit and have obtained necessary documentation. Identified pt with two pt identifiers(name and ). Health Maintenance Due   Topic    COLONOSCOPY     Pneumococcal 19-64 Medium Risk (1 of 1 - PPSV23)         Chief Complaint   Patient presents with    Results        Wt Readings from Last 3 Encounters:   10/30/17 248 lb 1.6 oz (112.5 kg)   17 240 lb 6.4 oz (109 kg)   17 228 lb 4.8 oz (103.6 kg)     Temp Readings from Last 3 Encounters:   10/30/17 97.5 °F (36.4 °C) (Oral)   17 (!) 70 °F (21.1 °C)   17 97.9 °F (36.6 °C) (Oral)     BP Readings from Last 3 Encounters:   10/30/17 140/80   17 130/80   17 118/82     Pulse Readings from Last 3 Encounters:   10/30/17 62   17 68   17 75           Learning Assessment:  :     Learning Assessment 10/12/2015 2015   PRIMARY LEARNER Patient Patient   HIGHEST LEVEL OF EDUCATION - PRIMARY LEARNER  DID NOT GRADUATE HIGH SCHOOL DID NOT GRADUATE HIGH SCHOOL   BARRIERS PRIMARY LEARNER NONE NONE   CO-LEARNER CAREGIVER No No   PRIMARY LANGUAGE ENGLISH ENGLISH    NEED No -   LEARNER PREFERENCE PRIMARY LISTENING DEMONSTRATION   LEARNING SPECIAL TOPICS no -   ANSWERED BY patient patient   RELATIONSHIP SELF SELF       Depression Screening:  :     PHQ over the last two weeks 2017   Little interest or pleasure in doing things Several days   Feeling down, depressed or hopeless Several days   Total Score PHQ 2 2       Fall Risk Assessment:  :     No flowsheet data found. Abuse Screening:  :     Abuse Screening Questionnaire 10/12/2015 2015   Do you ever feel afraid of your partner? N N   Are you in a relationship with someone who physically or mentally threatens you? N N   Is it safe for you to go home?  Y Y       Coordination of Care Questionnaire:  :     1) Have you been to an emergency room, urgent care clinic since your last visit? no   Hospitalized since your last visit? no             2) Have you seen or consulted any other health care providers outside of [address] [place] [address] since your last visit? no  (Include any pap smears or colon screenings in this section.)    3) Do you have an Advance Directive on file? no    4) Are you interested in receiving information on Advance Directives? NO      Patient is accompanied by self I have received verbal consent from Malu Cano to discuss any/all medical information while they are present in the room. Reviewed record  In preparation for visit and have obtained necessary documentation.

## 2018-01-22 ENCOUNTER — OFFICE VISIT (OUTPATIENT)
Dept: INTERNAL MEDICINE CLINIC | Age: 53
End: 2018-01-22

## 2018-01-22 VITALS
BODY MASS INDEX: 39.43 KG/M2 | HEIGHT: 68 IN | RESPIRATION RATE: 16 BRPM | SYSTOLIC BLOOD PRESSURE: 140 MMHG | OXYGEN SATURATION: 98 % | TEMPERATURE: 97.8 F | DIASTOLIC BLOOD PRESSURE: 80 MMHG | WEIGHT: 260.2 LBS | HEART RATE: 65 BPM

## 2018-01-22 DIAGNOSIS — B35.1 ONYCHOMYCOSIS: ICD-10-CM

## 2018-01-22 DIAGNOSIS — M72.2 PLANTAR FASCIITIS, BILATERAL: Primary | ICD-10-CM

## 2018-01-22 DIAGNOSIS — M77.30 CALCANEAL SPUR, UNSPECIFIED LATERALITY: ICD-10-CM

## 2018-01-22 DIAGNOSIS — E66.09 CLASS 2 OBESITY DUE TO EXCESS CALORIES WITHOUT SERIOUS COMORBIDITY WITH BODY MASS INDEX (BMI) OF 37.0 TO 37.9 IN ADULT: ICD-10-CM

## 2018-01-22 DIAGNOSIS — Z51.81 MEDICATION MONITORING ENCOUNTER: ICD-10-CM

## 2018-01-22 RX ORDER — TERBINAFINE HYDROCHLORIDE 250 MG/1
250 TABLET ORAL DAILY
COMMUNITY
End: 2018-05-14

## 2018-01-22 NOTE — PROGRESS NOTES
Subjective:     Chief Complaint   Patient presents with    Medication Evaluation    Weight Gain     fluid in leg     She  is a 50y.o. year old female who presents for evaluation. Heels are tender when first walks on them. Has noted some slight swelling in calves. Is on treatment for fungal nail disease. Has gained some weight. Historical Data    Past Medical History:   Diagnosis Date    Adverse effect of anesthesia     DELAYED AWAKENING    Cyst of skin     right side of face    Essential hypertension 2016    PT DENIES        Past Surgical History:   Procedure Laterality Date    HX  SECTION      HX HEENT      POLYPS ON VOCAL CORDS    HX HYSTERECTOMY      HX OTHER SURGICAL  2016    PARATHYROIDECTOMY    HX PARTIAL HYSTERECTOMY          Outpatient Encounter Prescriptions as of 2018   Medication Sig Dispense Refill    terbinafine HCl (LAMISIL) 250 mg tablet Take 250 mg by mouth daily.  [DISCONTINUED] nystatin (MYCOSTATIN) topical cream Apply  to affected area two (2) times a day. 15 g 0    [DISCONTINUED] triamcinolone acetonide (KENALOG) 0.1 % topical cream Apply  to affected area two (2) times a day. use thin layer 15 g 0     No facility-administered encounter medications on file as of 2018. Allergies   Allergen Reactions    Latex Itching        Social History     Social History    Marital status: SINGLE     Spouse name: N/A    Number of children: N/A    Years of education: N/A     Occupational History    Not on file.      Social History Main Topics    Smoking status: Current Every Day Smoker     Packs/day: 0.50     Types: Cigarettes    Smokeless tobacco: Never Used    Alcohol use 3.0 oz/week     6 Standard drinks or equivalent per week      Comment: OCCASIONAL    Drug use: No    Sexual activity: Yes     Partners: Male     Other Topics Concern    Not on file     Social History Narrative        Review of Systems  Pertinent items are noted in HPI.    Objective:     Vitals:    01/22/18 0916   BP: 140/80   Pulse: 65   Resp: 16   Temp: 97.8 °F (36.6 °C)   TempSrc: Oral   SpO2: 98%   Weight: 260 lb 3.2 oz (118 kg)   Height: 5' 8\" (1.727 m)     Pleasant AAF in no acute distress. Cardiac: RRR without murmurs, gallops or rubs. Lungs: Clear to auscultation. Abdomen: Benign  Extremities:  Tender at insertion of plantar fascia. Trace edema. ASSESSMENT / PLAN:   1. Plantar fasciitis, bilateral  · Ice massage. · Stretches  · Night splints. - AMB SUPPLY ORDER    2. Calcaneal spur, unspecified laterality      3. Class 2 obesity due to excess calories without serious comorbidity with body mass index (BMI) of 37.0 to 37.9 in adult  I have reviewed/discussed the above normal BMI with the patient. I have recommended the following interventions: dietary management education, guidance, and counseling . [place] - TSH REFLEX TO T4  - HEPATIC FUNCTION PANEL    4. Medication monitoring encounter    - HEPATIC FUNCTION PANEL    5. Onychomycosis      Patient Instructions   Follow a calorie controlled, low salt, weight loss diet. Lose 20 pounds over the next 4 months. Ice your heels morning and night. Do arch stretches. Use a arch support. Wear night splints at night. Arch Pain: Exercises  Your Care Instructions  Here are some examples of typical rehabilitation exercises for your condition. Start each exercise slowly. Ease off the exercise if you start to have pain. Your doctor or physical therapist will tell you when you can start these exercises and which ones will work best for you. How to do the exercises  Plantar fascia stretch    4. Sit in a chair and put your affected foot on your other knee. 5. Hold the heel of your foot in one hand, and grasp your toes with the other hand. 6. Pull on your heel (toward your body), and at the same time pull your toes back with your other hand. 7. You should feel a stretch along the bottom of your foot.   8. Hold 15 to 30 seconds. 9. Repeat 2 to 4 times. Plantar fascia stretch (kneeling)    You may want to place a pillow under your knees for this exercise. 1. Get on your hands and knees on the floor. Keep your heels pointing up and the balls of your feet and your toes on the floor. 2. Slowly sit back toward your ankles. 3. If this is too hard, you can try doing it one leg at a time. Stand up, and then kneel on one knee and keep the other leg forward. Place the foot of your forward leg flat on the ground and bend that knee. The heel on the leg still behind you should point up. The ball and toes of that foot should be on the floor. Sit back toward that ankle. 4. Hold 15 to 30 seconds. 5. Repeat 2 to 4 times. Switch legs if you are doing this one leg at a time. Plantar fascia self-massage    1. Sit in a chair. 2. Place your affected foot on a firm, tube-shaped object, such as a can or water bottle. 3. Roll your foot back and forth over the object to massage the bottom of your foot. 4. If you want to do ice massage, fill a water bottle about three-fourths of the way full and freeze before using. 5. Continue for 2 to 5 minutes. Bilateral calf stretch (knees straight)    1. Place a book on the floor a few inches from a wall or countertop, and put the balls of your feet on it. Your heels should be on the floor. The book needs to be thick enough so that you can feel a gentle stretch in each calf. If you are not steady on your feet, hold on to a chair, counter, or wall while you do this stretch. 2. Keep your knees straight, and lean forward until you feel a stretch in each calf. 3. To get more stretch, add another book or use a thicker book, such as a phone book, a dictionary, or an encyclopedia. 4. Hold the stretch for at least 15 to 30 seconds. 5. Repeat 2 to 4 times. Bilateral calf stretch (knees bent)    1. Place a book on the floor a few inches from a wall or countertop, and put the balls of your feet on it.  Your heels should be on the floor. The book needs to be thick enough so that you can feel a gentle stretch in each calf. If you are not steady on your feet, hold on to a chair, counter, or wall while you do this stretch. 2. Bend your knees, and lean forward until you feel a stretch in each calf. 3. To get more stretch, add another book or use a thicker book, such as a phone book, a dictionary, or an encyclopedia. 4. Hold the stretch for at least 15 to 30 seconds. 5. Repeat 2 to 4 times. [place] pick-ups    1. Put some marbles on the floor next to a cup.  2. Sit down, and use the toes of your affected foot to lift up one marble from the floor at a time. Then try to put the marble in the cup.  3. Repeat 8 to 12 times. Towel scrunches    1. Sit down, and place your affected foot on a towel on the floor. You may also do this with both feet on the towel. 2. Scrunch the towel toward you with your toes. Then use your toes to push the towel back into place. 3. Repeat 8 to 12 times. Heel raises on a step    1. Stand on the bottom step of a staircase, facing up toward the stairs. Put the balls of your feet on the step. If you are not steady on your feet, hold on to the banister or wall. 2. Keeping both knees straight, slowly lift your heels above the step so that you are standing on your toes. Then slowly lower your heels below the step and toward the floor. 3. Return to the starting position, with your feet even with the step. 4. Repeat 8 to 12 times. Follow-up care is a key part of your treatment and safety. Be sure to make and go to all appointments, and call your doctor if you are having problems. It's also a good idea to know your test results and keep a list of the medicines you take. Where can you learn more? Go to http://cecelia-miah.info/. Enter H119 in the search box to learn more about \"Arch Pain: Exercises. \"  Current as of: March 21, 2017  Content Version: 11.4  © 5381-4292 [employer]. Care instructions adapted under license by [employer] (which disclaims liability or warranty for this information). If you have questions about a medical condition or this instruction, always ask your healthcare professional. [address] any warranty or liability for your use of this information. Plantar Fasciitis: Exercises  Your Care Instructions  Here are some examples of typical rehabilitation exercises for your condition. Start each exercise slowly. Ease off the exercise if you start to have pain. Your doctor or physical therapist will tell you when you can start these exercises and which ones will work best for you. How to do the exercises  Towel stretch    10. Sit with your legs extended and knees straight. 11. Place a towel around your foot just under the toes. 12. Hold each end of the towel in each hand, with your hands above your knees. 13. Pull back with the towel so that your foot stretches toward you. 14. Hold the position for at least 15 to 30 seconds. 15. Repeat 2 to 4 times a session, up to 5 sessions a day. Calf stretch    This exercise stretches the muscles at the back of the lower leg (the calf) and the Achilles tendon. Do this exercise 3 or 4 times a day, 5 days a week. 6. Stand facing a wall with your hands on the wall at about eye level. Put the leg you want to stretch about a step behind your other leg. 7. Keeping your back heel on the floor, bend your front knee until you feel a stretch in the back leg. 8. Hold the stretch for 15 to 30 seconds. Repeat 2 to 4 times. Plantar fascia and calf stretch    Stretching the plantar fascia and calf muscles can increase flexibility and decrease heel pain. You can do this exercise several times each day and before and after activity. 6. Stand on a step as shown above. Be sure to hold on to the banister.   7. Slowly let your heels down over the edge of the step as you relax your calf muscles. You should feel a gentle stretch across the bottom of your foot and up the back of your leg to your knee. 8. Hold the stretch about 15 to 30 seconds, and then tighten your calf muscle a little to bring your heel back up to the level of the step. Repeat 2 to 4 times. Towel curls    Make this exercise more challenging by placing a weighted object, such as a soup can, on the other end of the towel. 6. While sitting, place your foot on a towel on the floor and scrunch the towel toward you with your toes. 7. Then, also using your toes, push the towel away from you. [place] pickups    6. Put marbles on the floor next to a cup.  7. Using your toes, try to lift the marbles up from the floor and put them in the cup. Follow-up care is a key part of your treatment and safety. Be sure to make and go to all appointments, and call your doctor if you are having problems. It's also a good idea to know your test results and keep a list of the medicines you take. Where can you learn more? Go to http://ceceliaBioPheresismiah.info/. Eva Amor in the search box to learn more about \"Plantar Fasciitis: Exercises. \"  Current as of: March 21, 2017  Content Version: 11.4  © 6883-7039 [employer]. Care instructions adapted under license by [employer] (which disclaims liability or warranty for this information). If you have questions about a medical condition or this instruction, always ask your healthcare professional. [place] [address] any warranty or liability for your use of this information. Plantar Fasciitis: Care Instructions  Your Care Instructions    Plantar fasciitis is pain and inflammation of the plantar fascia, the tissue at the bottom of your foot that connects the heel bone to the toes. The plantar fascia also supports the arch. If you strain the plantar fascia, it can develop small tears and cause heel pain when you stand or walk.   Plantar fasciitis can be caused by running or other sports. It also may occur in people who are overweight or who have high arches or flat feet. You may get plantar fasciitis if you walk or stand for long periods, or have a tight Achilles tendon or calf muscles. You can improve your foot pain with rest and other care at home. It might take a few weeks to a few months for your foot to heal completely. Follow-up care is a key part of your treatment and safety. Be sure to make and go to all appointments, and call your doctor if you are having problems. It's also a good idea to know your test results and keep a list of the medicines you take. How can you care for yourself at home? · Rest your feet often. Reduce your activity to a level that lets you avoid pain. If possible, do not run or walk on hard surfaces. · Take pain medicines exactly as directed. ¨ If the doctor gave you a prescription medicine for pain, take it as prescribed. ¨ If you are not taking a prescription pain medicine, take an over-the-counter anti-inflammatory medicine for pain and swelling, such as ibuprofen (Advil, Motrin) or naproxen (Aleve). Read and follow all instructions on the label. · Use ice massage to help with pain and swelling. You can use an ice cube or an ice cup several times a day. To make an ice cup, fill a paper cup with water and freeze it. Cut off the top of the cup until a half-inch of ice shows. Hold onto the remaining paper to use the cup. Rub the ice in small circles over the area for 5 to 7 minutes. · Contrast baths, which alternate hot and cold water, can also help reduce swelling. But because heat alone may make pain and swelling worse, end a contrast bath with a soak in cold water. · Wear a night splint if your doctor suggests it. A night splint holds your foot with the toes pointed up and the foot and ankle at a 90-degree angle. This position gives the bottom of your foot a constant, gentle stretch.   · Do simple exercises such as calf stretches and towel stretches 2 to 3 times each day, especially when you first get up in the morning. These can help the plantar fascia become more flexible. They also make the muscles that support your arch stronger. Hold these stretches for 15 to 30 seconds per stretch. Repeat 2 to 4 times. ¨ Stand about 1 foot from a wall. Place the palms of both hands against the wall at chest level. Lean forward against the wall, keeping one leg with the knee straight and heel on the ground while bending the knee of the other leg. ¨ Sit down on the floor or a mat with your feet stretched in front of you. Roll up a towel lengthwise, and loop it over the ball of your foot. Holding the towel at both ends, gently pull the towel toward you to stretch your foot. · Wear shoes with good arch support. Athletic shoes or shoes with a well-cushioned sole are good choices. · Try heel cups or shoe inserts (orthotics) to help cushion your heel. You can buy these at many shoe stores. · Put on your shoes as soon as you get out of bed. Going barefoot or wearing slippers may make your pain worse. · Reach and stay at a good weight for your height. This puts less strain on your feet. When should you call for help? Call your doctor now or seek immediate medical care if:  · You have heel pain with fever, redness, or warmth in your heel. · You cannot put weight on the sore foot. Watch closely for changes in your health, and be sure to contact your doctor if:  · You have numbness or tingling in your heel. · Your heel pain lasts more than 2 weeks. Where can you learn more? Go to http://cecelia-miah.info/. Zechariah Section in the search box to learn more about \"Plantar Fasciitis: Care Instructions. \"  Current as of: March 21, 2017  Content Version: 11.4  © 4152-5524 [employer]. Care instructions adapted under license by [employer] (which disclaims liability or warranty for this information).  If you have questions about a medical condition or this instruction, always ask your healthcare professional. [place] [address] any warranty or liability for your use of this information. Follow-up Disposition:  Return in about 4 months (around 5/22/2018) for F/U HTN. Advised her to call back or return to office if symptoms worsen/change/persist.  Discussed expected course/resolution/complications of diagnosis in detail with patient. Medication risks/benefits/costs/interactions/alternatives discussed with patient. She was given an after visit summary which includes diagnoses, current medications, & vitals. She expressed understanding with the diagnosis and plan.

## 2018-01-22 NOTE — PATIENT INSTRUCTIONS
Follow a calorie controlled, low salt, weight loss diet. Lose 20 pounds over the next 4 months. Ice your heels morning and night. Do arch stretches. Use a arch support. Wear night splints at night. Arch Pain: Exercises  Your Care Instructions  Here are some examples of typical rehabilitation exercises for your condition. Start each exercise slowly. Ease off the exercise if you start to have pain. Your doctor or physical therapist will tell you when you can start these exercises and which ones will work best for you. How to do the exercises  Plantar fascia stretch    1. Sit in a chair and put your affected foot on your other knee. 2. Hold the heel of your foot in one hand, and grasp your toes with the other hand. 3. Pull on your heel (toward your body), and at the same time pull your toes back with your other hand. 4. You should feel a stretch along the bottom of your foot. 5. Hold 15 to 30 seconds. 6. Repeat 2 to 4 times. Plantar fascia stretch (kneeling)    You may want to place a pillow under your knees for this exercise. 1. Get on your hands and knees on the floor. Keep your heels pointing up and the balls of your feet and your toes on the floor. 2. Slowly sit back toward your ankles. 3. If this is too hard, you can try doing it one leg at a time. Stand up, and then kneel on one knee and keep the other leg forward. Place the foot of your forward leg flat on the ground and bend that knee. The heel on the leg still behind you should point up. The ball and toes of that foot should be on the floor. Sit back toward that ankle. 4. Hold 15 to 30 seconds. 5. Repeat 2 to 4 times. Switch legs if you are doing this one leg at a time. Plantar fascia self-massage    1. Sit in a chair. 2. Place your affected foot on a firm, tube-shaped object, such as a can or water bottle. 3. Roll your foot back and forth over the object to massage the bottom of your foot.   4. If you want to do ice massage, fill a water bottle about three-fourths of the way full and freeze before using. 5. Continue for 2 to 5 minutes. Bilateral calf stretch (knees straight)    1. Place a book on the floor a few inches from a wall or countertop, and put the balls of your feet on it. Your heels should be on the floor. The book needs to be thick enough so that you can feel a gentle stretch in each calf. If you are not steady on your feet, hold on to a chair, counter, or wall while you do this stretch. 2. Keep your knees straight, and lean forward until you feel a stretch in each calf. 3. To get more stretch, add another book or use a thicker book, such as a phone book, a dictionary, or an encyclopedia. 4. Hold the stretch for at least 15 to 30 seconds. 5. Repeat 2 to 4 times. Bilateral calf stretch (knees bent)    1. Place a book on the floor a few inches from a wall or countertop, and put the balls of your feet on it. Your heels should be on the floor. The book needs to be thick enough so that you can feel a gentle stretch in each calf. If you are not steady on your feet, hold on to a chair, counter, or wall while you do this stretch. 2. Bend your knees, and lean forward until you feel a stretch in each calf. 3. To get more stretch, add another book or use a thicker book, such as a phone book, a dictionary, or an encyclopedia. 4. Hold the stretch for at least 15 to 30 seconds. 5. Repeat 2 to 4 times. [place] pick-ups    1. Put some marbles on the floor next to a cup.  2. Sit down, and use the toes of your affected foot to lift up one marble from the floor at a time. Then try to put the marble in the cup.  3. Repeat 8 to 12 times. Towel scrunches    1. Sit down, and place your affected foot on a towel on the floor. You may also do this with both feet on the towel. 2. Scrunch the towel toward you with your toes. Then use your toes to push the towel back into place. 3. Repeat 8 to 12 times. Heel raises on a step    1.  Stand on the bottom step of a staircase, facing up toward the stairs. Put the balls of your feet on the step. If you are not steady on your feet, hold on to the banister or wall. 2. Keeping both knees straight, slowly lift your heels above the step so that you are standing on your toes. Then slowly lower your heels below the step and toward the floor. 3. Return to the starting position, with your feet even with the step. 4. Repeat 8 to 12 times. Follow-up care is a key part of your treatment and safety. Be sure to make and go to all appointments, and call your doctor if you are having problems. It's also a good idea to know your test results and keep a list of the medicines you take. Where can you learn more? Go to http://cecelia-miah.info/. Enter H119 in the search box to learn more about \"Arch Pain: Exercises. \"  Current as of: March 21, 2017  Content Version: 11.4  © 4736-5437 [employer]. Care instructions adapted under license by [employer] (which disclaims liability or warranty for this information). If you have questions about a medical condition or this instruction, always ask your healthcare professional. [place] [address] any warranty or liability for your use of this information. Plantar Fasciitis: Exercises  Your Care Instructions  Here are some examples of typical rehabilitation exercises for your condition. Start each exercise slowly. Ease off the exercise if you start to have pain. Your doctor or physical therapist will tell you when you can start these exercises and which ones will work best for you. How to do the exercises  Towel stretch    7. Sit with your legs extended and knees straight. 8. Place a towel around your foot just under the toes. 9. Hold each end of the towel in each hand, with your hands above your knees. 10. Pull back with the towel so that your foot stretches toward you.   11. Hold the position for at least 15 to 30 seconds. 12. Repeat 2 to 4 times a session, up to 5 sessions a day. Calf stretch    This exercise stretches the muscles at the back of the lower leg (the calf) and the Achilles tendon. Do this exercise 3 or 4 times a day, 5 days a week. 6. Stand facing a wall with your hands on the wall at about eye level. Put the leg you want to stretch about a step behind your other leg. 7. Keeping your back heel on the floor, bend your front knee until you feel a stretch in the back leg. 8. Hold the stretch for 15 to 30 seconds. Repeat 2 to 4 times. Plantar fascia and calf stretch    Stretching the plantar fascia and calf muscles can increase flexibility and decrease heel pain. You can do this exercise several times each day and before and after activity. 6. Stand on a step as shown above. Be sure to hold on to the banister. 7. Slowly let your heels down over the edge of the step as you relax your calf muscles. You should feel a gentle stretch across the bottom of your foot and up the back of your leg to your knee. 8. Hold the stretch about 15 to 30 seconds, and then tighten your calf muscle a little to bring your heel back up to the level of the step. Repeat 2 to 4 times. Towel curls    Make this exercise more challenging by placing a weighted object, such as a soup can, on the other end of the towel. 6. While sitting, place your foot on a towel on the floor and scrunch the towel toward you with your toes. 7. Then, also using your toes, push the towel away from you. [place] pickups    6. Put marbles on the floor next to a cup.  7. Using your toes, try to lift the marbles up from the floor and put them in the cup. Follow-up care is a key part of your treatment and safety. Be sure to make and go to all appointments, and call your doctor if you are having problems. It's also a good idea to know your test results and keep a list of the medicines you take. Where can you learn more?   Go to http://cecelia-miah.info/. Jeny Kolby in the search box to learn more about \"Plantar Fasciitis: Exercises. \"  Current as of: March 21, 2017  Content Version: 11.4  © 5424-5672 [employer]. Care instructions adapted under license by [employer] (which disclaims liability or warranty for this information). If you have questions about a medical condition or this instruction, always ask your healthcare professional. [address] any warranty or liability for your use of this information. Plantar Fasciitis: Care Instructions  Your Care Instructions    Plantar fasciitis is pain and inflammation of the plantar fascia, the tissue at the bottom of your foot that connects the heel bone to the toes. The plantar fascia also supports the arch. If you strain the plantar fascia, it can develop small tears and cause heel pain when you stand or walk. Plantar fasciitis can be caused by running or other sports. It also may occur in people who are overweight or who have high arches or flat feet. You may get plantar fasciitis if you walk or stand for long periods, or have a tight Achilles tendon or calf muscles. You can improve your foot pain with rest and other care at home. It might take a few weeks to a few months for your foot to heal completely. Follow-up care is a key part of your treatment and safety. Be sure to make and go to all appointments, and call your doctor if you are having problems. It's also a good idea to know your test results and keep a list of the medicines you take. How can you care for yourself at home? · Rest your feet often. Reduce your activity to a level that lets you avoid pain. If possible, do not run or walk on hard surfaces. · Take pain medicines exactly as directed. ¨ If the doctor gave you a prescription medicine for pain, take it as prescribed.   ¨ If you are not taking a prescription pain medicine, take an over-the-counter anti-inflammatory medicine for pain and swelling, such as ibuprofen (Advil, Motrin) or naproxen (Aleve). Read and follow all instructions on the label. · Use ice massage to help with pain and swelling. You can use an ice cube or an ice cup several times a day. To make an ice cup, fill a paper cup with water and freeze it. Cut off the top of the cup until a half-inch of ice shows. Hold onto the remaining paper to use the cup. Rub the ice in small circles over the area for 5 to 7 minutes. · Contrast baths, which alternate hot and cold water, can also help reduce swelling. But because heat alone may make pain and swelling worse, end a contrast bath with a soak in cold water. · Wear a night splint if your doctor suggests it. A night splint holds your foot with the toes pointed up and the foot and ankle at a 90-degree angle. This position gives the bottom of your foot a constant, gentle stretch. · Do simple exercises such as calf stretches and towel stretches 2 to 3 times each day, especially when you first get up in the morning. These can help the plantar fascia become more flexible. They also make the muscles that support your arch stronger. Hold these stretches for 15 to 30 seconds per stretch. Repeat 2 to 4 times. ¨ Stand about 1 foot from a wall. Place the palms of both hands against the wall at chest level. Lean forward against the wall, keeping one leg with the knee straight and heel on the ground while bending the knee of the other leg. ¨ Sit down on the floor or a mat with your feet stretched in front of you. Roll up a towel lengthwise, and loop it over the ball of your foot. Holding the towel at both ends, gently pull the towel toward you to stretch your foot. · Wear shoes with good arch support. Athletic shoes or shoes with a well-cushioned sole are good choices. · Try heel cups or shoe inserts (orthotics) to help cushion your heel. You can buy these at many shoe stores.   · Put on your shoes as soon as you get out of bed. Going barefoot or wearing slippers may make your pain worse. · Reach and stay at a good weight for your height. This puts less strain on your feet. When should you call for help? Call your doctor now or seek immediate medical care if:  · You have heel pain with fever, redness, or warmth in your heel. · You cannot put weight on the sore foot. Watch closely for changes in your health, and be sure to contact your doctor if:  · You have numbness or tingling in your heel. · Your heel pain lasts more than 2 weeks. Where can you learn more? Go to http://cecelia-miah.info/. Maritza Betancourt in the search box to learn more about \"Plantar Fasciitis: Care Instructions. \"  Current as of: March 21, 2017  Content Version: 11.4  © 0244-8219 [employer]. Care instructions adapted under license by [employer] (which disclaims liability or warranty for this information). If you have questions about a medical condition or this instruction, always ask your healthcare professional. [address] any warranty or liability for your use of this information.

## 2018-01-22 NOTE — MR AVS SNAPSHOT
[address] [facility], [address] [facility] 57 
[phone] Patient: Jimmy Francois MRN: [id number] Visit Information Date & Time Provider Department Dept. Phone Encounter #  
 1/22/2018  9:15 AM MD Chanel Crisostomo [facility] [phone] Follow-up Instructions Return in about 4 months (around 5/22/2018) for F/U HTN. Your Appointments 4/30/2018  9:00 AM  
ROUTINE CARE with MD Chanel Crisostomo [facility]) Appt Note: 6m f/u for F/U weight. [address] , [address] [facility] 57  
[address], [facility] 88 [facility] [address] Upcoming Health Maintenance Date Due Pneumococcal 19-64 Medium Risk (1 of 1 - PPSV23) 7/30/1986 PAP AKA CERVICAL CYTOLOGY 6/1/2018 BREAST CANCER SCRN MAMMOGRAM 9/12/2019 DTaP/Tdap/Td series (2 - Td) 10/3/2026 Allergies as of 1/22/2018  Review Complete On: 1/22/2018 By: Praveena Ya MD  
  
 Severity Noted Reaction Type Reactions Latex  02/27/2015    Itching Current Immunizations  Reviewed on 2/27/2015 Name Date Influenza Nasal Vaccine 10/5/2017, 11/15/2015, 11/1/2014 Tdap 10/3/2016 Not reviewed this visit You Were Diagnosed With   
  
 Codes Comments Plantar fasciitis, bilateral    -  Primary ICD-10-CM: M72.2 ICD-9-CM: 728.71 Calcaneal spur, unspecified laterality     ICD-10-CM: M77.30 ICD-9-CM: 726.73 Class 2 obesity due to excess calories without serious comorbidity with body mass index (BMI) of 37.0 to 37.9 in adult     ICD-10-CM: E66.09, Q70.57 ICD-9-CM: 278.00, V85.37 Medication monitoring encounter     ICD-10-CM: Z51.81 
ICD-9-CM: V58.83 Onychomycosis     ICD-10-CM: B35.1 ICD-9-CM: 110.1 Vitals BP Pulse Temp Resp Height(growth percentile) Weight(growth percentile) 140/80 (BP 1 Location: Left arm, BP Patient Position: Sitting) 65 97.8 °F (36.6 °C) (Oral) 16 5' 8\" (1.727 m) 260 lb 3.2 oz (118 kg) SpO2 BMI OB Status Smoking Status 98% 39.56 kg/m2 Hysterectomy Current Every Day Smoker Vitals History BMI and BSA Data Body Mass Index Body Surface Area  
 39.56 kg/m 2 2.38 m 2 Preferred Pharmacy Pharmacy Name Phone [address] [phone] Your Updated Medication List  
  
   
This list is accurate as of: 1/22/18  9:43 AM.  Always use your most recent med list.  
  
  
  
  
 terbinafine HCl 250 mg tablet Commonly known as:  LAMISIL Take 250 mg by mouth daily. We Performed the Following AMB SUPPLY ORDER [[id number] Custom] Comments:  
 Night splint for plantar fasciitis (bilateral) HEPATIC FUNCTION PANEL [97972 CPT(R)] TSH REFLEX TO T4 [06058 CPT(R)] Follow-up Instructions Return in about 4 months (around 5/22/2018) for F/U HTN. Patient Instructions Follow a calorie controlled, low salt, weight loss diet. Lose 20 pounds over the next 4 months. Ice your heels morning and night. Do arch stretches. Use a arch support. Wear night splints at night. Arch Pain: Exercises Your Care Instructions Here are some examples of typical rehabilitation exercises for your condition. Start each exercise slowly. Ease off the exercise if you start to have pain. Your doctor or physical therapist will tell you when you can start these exercises and which ones will work best for you. How to do the exercises Plantar fascia stretch 1. Sit in a chair and put your affected foot on your other knee. 2. Hold the heel of your foot in one hand, and grasp your toes with the other hand. 3. Pull on your heel (toward your body), and at the same time pull your toes back with your other hand. 4. You should feel a stretch along the bottom of your foot. 5. Hold 15 to 30 seconds. 6. Repeat 2 to 4 times. Plantar fascia stretch (kneeling) You may want to place a pillow under your knees for this exercise. 1. Get on your hands and knees on the floor. Keep your heels pointing up and the balls of your feet and your toes on the floor. 2. Slowly sit back toward your ankles. 3. If this is too hard, you can try doing it one leg at a time. Stand up, and then kneel on one knee and keep the other leg forward. Place the foot of your forward leg flat on the ground and bend that knee. The heel on the leg still behind you should point up. The ball and toes of that foot should be on the floor. Sit back toward that ankle. 4. Hold 15 to 30 seconds. 5. Repeat 2 to 4 times. Switch legs if you are doing this one leg at a time. Plantar fascia self-massage 1. Sit in a chair. 2. Place your affected foot on a firm, tube-shaped object, such as a can or water bottle. 3. Roll your foot back and forth over the object to massage the bottom of your foot. 4. If you want to do ice massage, fill a water bottle about three-fourths of the way full and freeze before using. 5. Continue for 2 to 5 minutes. Bilateral calf stretch (knees straight) 1. Place a book on the floor a few inches from a wall or countertop, and put the balls of your feet on it. Your heels should be on the floor. The book needs to be thick enough so that you can feel a gentle stretch in each calf. If you are not steady on your feet, hold on to a chair, counter, or wall while you do this stretch. 2. Keep your knees straight, and lean forward until you feel a stretch in each calf. 3. To get more stretch, add another book or use a thicker book, such as a phone book, a dictionary, or an encyclopedia. 4. Hold the stretch for at least 15 to 30 seconds. 5. Repeat 2 to 4 times. Bilateral calf stretch (knees bent) 1. Place a book on the floor a few inches from a wall or countertop, and put the balls of your feet on it. Your heels should be on the floor. The book needs to be thick enough so that you can feel a gentle stretch in each calf. If you are not steady on your feet, hold on to a chair, counter, or wall while you do this stretch. 2. Bend your knees, and lean forward until you feel a stretch in each calf. 3. To get more stretch, add another book or use a thicker book, such as a phone book, a dictionary, or an encyclopedia. 4. Hold the stretch for at least 15 to 30 seconds. 5. Repeat 2 to 4 times. [place] pick-ups 1. Put some marbles on the floor next to a cup. 
2. Sit down, and use the toes of your affected foot to lift up one marble from the floor at a time. Then try to put the marble in the cup. 
3. Repeat 8 to 12 times. Towel scrunches 1. Sit down, and place your affected foot on a towel on the floor. You may also do this with both feet on the towel. 2. Scrunch the towel toward you with your toes. Then use your toes to push the towel back into place. 3. Repeat 8 to 12 times. Heel raises on a step 1. Stand on the bottom step of a staircase, facing up toward the stairs. Put the balls of your feet on the step. If you are not steady on your feet, hold on to the banister or wall. 2. Keeping both knees straight, slowly lift your heels above the step so that you are standing on your toes. Then slowly lower your heels below the step and toward the floor. 3. Return to the starting position, with your feet even with the step. 4. Repeat 8 to 12 times. Follow-up care is a key part of your treatment and safety. Be sure to make and go to all appointments, and call your doctor if you are having problems. It's also a good idea to know your test results and keep a list of the medicines you take. Where can you learn more? Go to http://cecelia-miah.info/. Enter H119 in the search box to learn more about \"Arch Pain: Exercises. \" Current as of: March 21, 2017 Content Version: 11.4 © 0633-6206 Bandcamp. Care instructions adapted under license by Tempeest (which disclaims liability or warranty for this information). If you have questions about a medical condition or this instruction, always ask your healthcare professional. [address] any warranty or liability for your use of this information. Plantar Fasciitis: Exercises Your Care Instructions Here are some examples of typical rehabilitation exercises for your condition. Start each exercise slowly. Ease off the exercise if you start to have pain. Your doctor or physical therapist will tell you when you can start these exercises and which ones will work best for you. How to do the exercises Towel stretch 7. Sit with your legs extended and knees straight. 8. Place a towel around your foot just under the toes. 9. Hold each end of the towel in each hand, with your hands above your knees. 10. Pull back with the towel so that your foot stretches toward you. 11. Hold the position for at least 15 to 30 seconds. 12. Repeat 2 to 4 times a session, up to 5 sessions a day. Calf stretch This exercise stretches the muscles at the back of the lower leg (the calf) and the Achilles tendon. Do this exercise 3 or 4 times a day, 5 days a week. 6. Stand facing a wall with your hands on the wall at about eye level. Put the leg you want to stretch about a step behind your other leg. 7. Keeping your back heel on the floor, bend your front knee until you feel a stretch in the back leg. 8. Hold the stretch for 15 to 30 seconds. Repeat 2 to 4 times. Plantar fascia and calf stretch Stretching the plantar fascia and calf muscles can increase flexibility and decrease heel pain. You can do this exercise several times each day and before and after activity. 6. Stand on a step as shown above. Be sure to hold on to the banister. 7. Slowly let your heels down over the edge of the step as you relax your calf muscles. You should feel a gentle stretch across the bottom of your foot and up the back of your leg to your knee. 8. Hold the stretch about 15 to 30 seconds, and then tighten your calf muscle a little to bring your heel back up to the level of the step. Repeat 2 to 4 times. Towel curls Make this exercise more challenging by placing a weighted object, such as a soup can, on the other end of the towel. 6. While sitting, place your foot on a towel on the floor and scrunch the towel toward you with your toes. 7. Then, also using your toes, push the towel away from you. [place] pickups 6. Put marbles on the floor next to a cup. 
7. Using your toes, try to lift the marbles up from the floor and put them in the cup. Follow-up care is a key part of your treatment and safety. Be sure to make and go to all appointments, and call your doctor if you are having problems. It's also a good idea to know your test results and keep a list of the medicines you take. Where can you learn more? Go to http://cecelia-miah.info/. Anne Tyson in the search box to learn more about \"Plantar Fasciitis: Exercises. \" Current as of: March 21, 2017 Content Version: 11.4 © 4584-6014 [email or web address]. Care instructions adapted under license by Omnicademy (which disclaims liability or warranty for this information). If you have questions about a medical condition or this instruction, always ask your healthcare professional. [place] [address] any warranty or liability for your use of this information. Plantar Fasciitis: Care Instructions Your Care Instructions Plantar fasciitis is pain and inflammation of the plantar fascia, the tissue at the bottom of your foot that connects the heel bone to the toes. The plantar fascia also supports the arch. If you strain the plantar fascia, it can develop small tears and cause heel pain when you stand or walk. Plantar fasciitis can be caused by running or other sports. It also may occur in people who are overweight or who have high arches or flat feet. You may get plantar fasciitis if you walk or stand for long periods, or have a tight Achilles tendon or calf muscles. You can improve your foot pain with rest and other care at home. It might take a few weeks to a few months for your foot to heal completely. Follow-up care is a key part of your treatment and safety. Be sure to make and go to all appointments, and call your doctor if you are having problems. It's also a good idea to know your test results and keep a list of the medicines you take. How can you care for yourself at home? · Rest your feet often. Reduce your activity to a level that lets you avoid pain. If possible, do not run or walk on hard surfaces. · Take pain medicines exactly as directed. ¨ If the doctor gave you a prescription medicine for pain, take it as prescribed. ¨ If you are not taking a prescription pain medicine, take an over-the-counter anti-inflammatory medicine for pain and swelling, such as ibuprofen (Advil, Motrin) or naproxen (Aleve). Read and follow all instructions on the label. · Use ice massage to help with pain and swelling. You can use an ice cube or an ice cup several times a day. To make an ice cup, fill a paper cup with water and freeze it. Cut off the top of the cup until a half-inch of ice shows. Hold onto the remaining paper to use the cup. Rub the ice in small circles over the area for 5 to 7 minutes. · Contrast baths, which alternate hot and cold water, can also help reduce swelling. But because heat alone may make pain and swelling worse, end a contrast bath with a soak in cold water. · Wear a night splint if your doctor suggests it.  A night splint holds your foot with the toes pointed up and the foot and ankle at a 90-degree angle. This position gives the bottom of your foot a constant, gentle stretch. · Do simple exercises such as calf stretches and towel stretches 2 to 3 times each day, especially when you first get up in the morning. These can help the plantar fascia become more flexible. They also make the muscles that support your arch stronger. Hold these stretches for 15 to 30 seconds per stretch. Repeat 2 to 4 times. ¨ Stand about 1 foot from a wall. Place the palms of both hands against the wall at chest level. Lean forward against the wall, keeping one leg with the knee straight and heel on the ground while bending the knee of the other leg. ¨ Sit down on the floor or a mat with your feet stretched in front of you. Roll up a towel lengthwise, and loop it over the ball of your foot. Holding the towel at both ends, gently pull the towel toward you to stretch your foot. · Wear shoes with good arch support. Athletic shoes or shoes with a well-cushioned sole are good choices. · Try heel cups or shoe inserts (orthotics) to help cushion your heel. You can buy these at many shoe stores. · Put on your shoes as soon as you get out of bed. Going barefoot or wearing slippers may make your pain worse. · Reach and stay at a good weight for your height. This puts less strain on your feet. When should you call for help? Call your doctor now or seek immediate medical care if: 
· You have heel pain with fever, redness, or warmth in your heel. · You cannot put weight on the sore foot. Watch closely for changes in your health, and be sure to contact your doctor if: 
· You have numbness or tingling in your heel. · Your heel pain lasts more than 2 weeks. Where can you learn more? Go to http://cecelia-miah.info/. Lisa Estevez in the search box to learn more about \"Plantar Fasciitis: Care Instructions. \" Current as of: March 21, 2017 Content Version: 11.4 © 7615-8541 [email or web address].com. Care instructions adapted under license by Youca.st (which disclaims liability or warranty for this information). If you have questions about a medical condition or this instruction, always ask your healthcare professional. [address] any warranty or liability for your use of this information. Introducing [facility]! Dear Rogerio Connors: 
Thank you for requesting a HoneyComb account. Our records indicate that you already have an active HoneyComb account. You can access your account anytime at [email or web address] [email or web address]/Napo [email or web address] Did you know that you can access your hospital and ER discharge instructions at any time in HoneyComb? You can also review all of your test results from your hospital stay or ER visit. Additional Information If you have questions, please visit the Frequently Asked Questions section of the HoneyComb website at [email or web address] Remember, HoneyComb is NOT to be used for urgent needs. For medical emergencies, dial 911. Now available from your iPhone and Android! Please provide this summary of care documentation to your next provider. Your primary care clinician is listed as Dulce Maria Sanchez. If you have any questions after today's visit, please call [phone].

## 2018-01-22 NOTE — PROGRESS NOTES
Chief Complaint   Patient presents with    Medication Evaluation    Weight Gain     fluid in leg     Reviewed record in preparation for visit and have obtained necessary documentation. Identified pt with two pt identifiers(name and ). Health Maintenance Due   Topic    Pneumococcal 19-64 Medium Risk (1 of 1 - PPSV23)         Chief Complaint   Patient presents with    Medication Evaluation    Weight Gain     fluid in leg        Wt Readings from Last 3 Encounters:   18 260 lb 3.2 oz (118 kg)   10/30/17 248 lb 1.6 oz (112.5 kg)   17 240 lb 6.4 oz (109 kg)     Temp Readings from Last 3 Encounters:   18 97.8 °F (36.6 °C) (Oral)   10/30/17 97.5 °F (36.4 °C) (Oral)   17 (!) 70 °F (21.1 °C)     BP Readings from Last 3 Encounters:   18 140/80   10/30/17 140/80   17 130/80     Pulse Readings from Last 3 Encounters:   18 65   10/30/17 62   17 68           Learning Assessment:  :     Learning Assessment 10/12/2015 2015   PRIMARY LEARNER Patient Patient   HIGHEST LEVEL OF EDUCATION - PRIMARY LEARNER  DID NOT GRADUATE HIGH SCHOOL DID NOT GRADUATE HIGH SCHOOL   BARRIERS PRIMARY LEARNER NONE NONE   CO-LEARNER CAREGIVER No No   PRIMARY LANGUAGE ENGLISH ENGLISH    NEED No -   LEARNER PREFERENCE PRIMARY LISTENING DEMONSTRATION   LEARNING SPECIAL TOPICS no -   ANSWERED BY patient patient   RELATIONSHIP SELF SELF       Depression Screening:  :     PHQ over the last two weeks 2017   Little interest or pleasure in doing things Several days   Feeling down, depressed or hopeless Several days   Total Score PHQ 2 2       Fall Risk Assessment:  :     No flowsheet data found. Abuse Screening:  :     Abuse Screening Questionnaire 10/12/2015 2015   Do you ever feel afraid of your partner? N N   Are you in a relationship with someone who physically or mentally threatens you? N N   Is it safe for you to go home?  Gaurav Llamas       Coordination of Care Questionnaire:  :     1) Have you been to an emergency room, urgent care clinic since your last visit? no   Hospitalized since your last visit? no             2) Have you seen or consulted any other health care providers outside of [address] [place] [address] since your last visit? yes  (Include any pap smears or colon screenings in this section.)    3) Do you have an Advance Directive on file? no    4) Are you interested in receiving information on Advance Directives? NO      Patient is accompanied by self I have received verbal consent from Sheridan Aschoff to discuss any/all medical information while they are present in the room. Reviewed record  In preparation for visit and have obtained necessary documentation.

## 2018-01-23 LAB — TSH SERPL DL<=0.005 MIU/L-ACNC: 3.14 UIU/ML (ref 0.45–4.5)

## 2018-04-30 ENCOUNTER — DOCUMENTATION ONLY (OUTPATIENT)
Dept: INTERNAL MEDICINE CLINIC | Age: 53
End: 2018-04-30

## 2018-04-30 NOTE — PROGRESS NOTES
Patient presented today 12 minutes late for her 9am appointment. There were others in front of her to be checked in and was told to take a seat and we would call her when ready to check in. Other people were taken in front of her, then making her 19 minutes late at check-in. Patient was told to reschedule and a 3pm appointment for today was made. Patient unhappy that she was told to reschedule and called to speak to me. I let her know that she was to be at our office at 8:45am for check in and that she arrived late, was offered another appointment and another provider if she wanted to be seen earlier in the day. Patient declined appointment and said she will call if needed. I reminded her that this was also for a f/u and she would need to be seen soon. I will follow up with patient and suggest scheduling an appointment on Wednesday.

## 2018-05-14 ENCOUNTER — OFFICE VISIT (OUTPATIENT)
Dept: INTERNAL MEDICINE CLINIC | Age: 53
End: 2018-05-14

## 2018-05-14 VITALS
WEIGHT: 264.5 LBS | TEMPERATURE: 98 F | RESPIRATION RATE: 14 BRPM | OXYGEN SATURATION: 95 % | DIASTOLIC BLOOD PRESSURE: 98 MMHG | BODY MASS INDEX: 40.09 KG/M2 | HEART RATE: 80 BPM | HEIGHT: 68 IN | SYSTOLIC BLOOD PRESSURE: 122 MMHG

## 2018-05-14 DIAGNOSIS — E66.01 OBESITY, MORBID (HCC): Primary | ICD-10-CM

## 2018-05-14 PROBLEM — E66.09 CLASS 2 OBESITY DUE TO EXCESS CALORIES WITHOUT SERIOUS COMORBIDITY WITH BODY MASS INDEX (BMI) OF 37.0 TO 37.9 IN ADULT: Status: RESOLVED | Noted: 2017-10-30 | Resolved: 2018-05-14

## 2018-05-14 RX ORDER — DICLOFENAC SODIUM 75 MG/1
TABLET, DELAYED RELEASE ORAL
COMMUNITY
End: 2019-01-22

## 2018-05-14 NOTE — PROGRESS NOTES
Chief Complaint   Patient presents with    Swelling     Pt c/o swelling in ankles, legs, and feet. 1. Have you been to the ER, urgent care clinic since your last visit? Hospitalized since your last visit? No    2. Have you seen or consulted any other health care providers outside of the [address] [place] [address] since your last visit? Include any pap smears or colon screening.  No

## 2018-05-14 NOTE — MR AVS SNAPSHOT
[address] [facility], [address] [place] [address] 
[phone] Patient: Milly Trinidad MRN: [id number] Visit Information Date & Time Provider Department Dept. Phone Encounter #  
 5/14/2018  1:15 PM MD Ninfa Geronimova [facility] [phone] Follow-up Instructions Return in about 6 weeks (around 6/25/2018) for F/U weight. Upcoming Health Maintenance Date Due Pneumococcal 19-64 Medium Risk (1 of 1 - PPSV23) 7/30/1986 PAP AKA CERVICAL CYTOLOGY 6/1/2018 Influenza Age 5 to Adult 8/1/2018 BREAST CANCER SCRN MAMMOGRAM 9/12/2019 DTaP/Tdap/Td series (2 - Td) 10/3/2026 Allergies as of 5/14/2018  Review Complete On: 5/14/2018 By: Krupa Irving MD  
  
 Severity Noted Reaction Type Reactions Latex  02/27/2015    Itching Current Immunizations  Reviewed on 2/27/2015 Name Date Influenza Nasal Vaccine 10/5/2017, 11/15/2015, 11/1/2014 Tdap 10/3/2016 Not reviewed this visit You Were Diagnosed With   
  
 Codes Comments Obesity, morbid ([facility] [place]    -  Primary ICD-10-CM: E66.01 
ICD-9-CM: 278.01 Vitals BP Pulse Temp Resp Height(growth percentile) Weight(growth percentile) (!) 122/98 (BP 1 Location: Left arm, BP Patient Position: Sitting) 80 98 °F (36.7 °C) (Oral) 14 5' 8\" (1.727 m) 264 lb 8 oz (120 kg) SpO2 BMI OB Status Smoking Status 95% 40.22 kg/m2 Hysterectomy Current Every Day Smoker Vitals History BMI and BSA Data Body Mass Index Body Surface Area  
 40.22 kg/m 2 2.4 m 2 Preferred Pharmacy Pharmacy Name Phone [address] [facility] [address] , [address] [place] [address] [phone] Your Updated Medication List  
  
   
This list is accurate as of 5/14/18  1:20 PM.  Always use your most recent med list.  
  
  
  
  
 diclofenac EC 75 mg EC tablet Commonly known as:  VOLTAREN  
 Take  by mouth.  
  
 naltrexone-buPROPion 8-90 mg Tber ER tablet Commonly known as:  Sania Shell Week 1 1 tab PO QAM, Week 2 1QAM 1QHS, Week 3 2QAM 1 QHS, Week 4 & beyond 2QAM 2QHS  Indications: WEIGHT LOSS MANAGEMENT FOR OBESE PATIENT (BMI >= 30) Prescriptions Printed Refills  
 naltrexone-buPROPion (CONTRAVE) 8-90 mg TbER ER tablet 1 Sig: Week 1 1 tab PO QAM, Week 2 1QAM 1QHS, Week 3 2QAM 1 QHS, Week 4 & beyond 2QAM 2QHS  Indications: WEIGHT LOSS MANAGEMENT FOR OBESE PATIENT (BMI >= 30) Class: Print Follow-up Instructions Return in about 6 weeks (around 6/25/2018) for F/U weight. Patient Instructions Follow a low salt diet. Avoid foods with a high glycemic index. Continue diclofenac for inflammation. Start Contrave for weight loss. Introducing hospitals & [facility]! Dear Miladys Pinzonr: 
Thank you for requesting a Toushay - It's what's in store account. Our records indicate that you already have an active Toushay - It's what's in store account. You can access your account anytime at [email or web address] Did you know that you can access your hospital and ER discharge instructions at any time in Toushay - It's what's in store? You can also review all of your test results from your hospital stay or ER visit. Additional Information If you have questions, please visit the Frequently Asked Questions section of the Toushay - It's what's in store website at [email or web address] Remember, Toushay - It's what's in store is NOT to be used for urgent needs. For medical emergencies, dial 911. Now available from your iPhone and Android! Please provide this summary of care documentation to your next provider. Your primary care clinician is listed as Gadiel Murray. If you have any questions after today's visit, please call [phone].

## 2018-05-14 NOTE — PATIENT INSTRUCTIONS
Follow a low salt diet. Avoid foods with a high glycemic index. Continue diclofenac for inflammation. Start Contrave for weight loss.

## 2018-05-14 NOTE — PROGRESS NOTES
Subjective:     Chief Complaint   Patient presents with    Swelling     Pt c/o swelling in ankles, legs, and feet. She  is a 50y.o. year old female who presents for evaluation. Legs getting heavy and has swelling. Having allergies too. Interested in diet pill too. Just got a shot in her foot with good results. Historical Data    Past Medical History:   Diagnosis Date    Adverse effect of anesthesia     DELAYED AWAKENING    Cyst of skin     right side of face    Essential hypertension 2016    PT DENIES        Past Surgical History:   Procedure Laterality Date    HX  SECTION      HX HEENT      POLYPS ON VOCAL CORDS    HX HYSTERECTOMY      HX OTHER SURGICAL  2016    PARATHYROIDECTOMY    HX PARTIAL HYSTERECTOMY          Outpatient Encounter Prescriptions as of 2018   Medication Sig Dispense Refill    diclofenac EC (VOLTAREN) 75 mg EC tablet Take  by mouth.  terbinafine HCl (LAMISIL) 250 mg tablet Take 250 mg by mouth daily. No facility-administered encounter medications on file as of 2018. Allergies   Allergen Reactions    Latex Itching        Social History     Social History    Marital status: SINGLE     Spouse name: N/A    Number of children: N/A    Years of education: N/A     Occupational History    Not on file. Social History Main Topics    Smoking status: Current Every Day Smoker     Packs/day: 0.50     Types: Cigarettes    Smokeless tobacco: Never Used    Alcohol use 3.0 oz/week     6 Standard drinks or equivalent per week      Comment: OCCASIONAL    Drug use: No    Sexual activity: Yes     Partners: Male     Other Topics Concern    Not on file     Social History Narrative        Review of Systems  Pertinent items are noted in HPI.     Objective:     Vitals:    18 1306   BP: (!) 122/98   Pulse: 80   Resp: 14   Temp: 98 °F (36.7 °C)   TempSrc: Oral   SpO2: 95%   Weight: 264 lb 8 oz (120 kg)   Height: 5' 8\" (1.727 m) Pleasant AAF in on acute distress. LE's:  Diffuse thickness without pitting edema per se. ASSESSMENT / PLAN:   1. Obesity, morbid (HCC)  · Low salt, low glycemic index diet. · Trial of Contrave. - naltrexone-buPROPion (CONTRAVE) 8-90 mg TbER ER tablet; Week 1 1 tab PO QAM, Week 2 1QAM 1QHS, Week 3 2QAM 1 QHS, Week 4 & beyond 2QAM 2QHS  Indications: WEIGHT LOSS MANAGEMENT FOR OBESE PATIENT (BMI >= 30)  Dispense: 100 Tab; Refill: 1  Patient Instructions   Follow a low salt diet. Avoid foods with a high glycemic index. Continue diclofenac for inflammation. Start Contrave for weight loss. Follow-up Disposition:  Return in about 6 weeks (around 6/25/2018) for F/U weight. Advised her to call back or return to office if symptoms worsen/change/persist.  Discussed expected course/resolution/complications of diagnosis in detail with patient. Medication risks/benefits/costs/interactions/alternatives discussed with patient. She was given an after visit summary which includes diagnoses, current medications, & vitals. She expressed understanding with the diagnosis and plan.

## 2018-08-02 ENCOUNTER — TELEPHONE (OUTPATIENT)
Dept: INTERNAL MEDICINE CLINIC | Age: 53
End: 2018-08-02

## 2018-08-02 NOTE — TELEPHONE ENCOUNTER
Noé Martelling 1965       Pt tried to donate plasma today and was informed that she probably has a blood clot and to contact pcp as soon as possible. Pt states that she is at at work and does not get off until 4.

## 2018-08-02 NOTE — TELEPHONE ENCOUNTER
Patient advised via voicemail that she needs to report to nearest urgent care facility for further evaluation per Dr Donte Masters

## 2018-08-07 ENCOUNTER — HOSPITAL ENCOUNTER (EMERGENCY)
Age: 53
Discharge: HOME | End: 2018-08-07
Attending: EMERGENCY MEDICINE | Admitting: EMERGENCY MEDICINE
Payer: COMMERCIAL

## 2018-08-07 VITALS
DIASTOLIC BLOOD PRESSURE: 88 MMHG | HEIGHT: 68 IN | TEMPERATURE: 98 F | BODY MASS INDEX: 41.23 KG/M2 | SYSTOLIC BLOOD PRESSURE: 156 MMHG | HEART RATE: 76 BPM | WEIGHT: 272.05 LBS | RESPIRATION RATE: 16 BRPM | OXYGEN SATURATION: 97 %

## 2018-08-07 DIAGNOSIS — Z00.00 EVALUATION BY MEDICAL SERVICE REQUIRED: Primary | ICD-10-CM

## 2018-08-07 PROCEDURE — 99282 EMERGENCY DEPT VISIT SF MDM: CPT

## 2018-08-07 NOTE — DISCHARGE INSTRUCTIONS
We hope that we have addressed all of your medical concerns. The examination and treatment you received in the Emergency Department were for an emergent problem and were not intended as complete care. It is important that you follow up with your healthcare provider(s) for ongoing care. If your symptoms worsen or do not improve as expected, and you are unable to reach your usual health care provider(s), you should return to the Emergency Department. Today's healthcare is undergoing tremendous change, and patient satisfaction surveys are one of the many tools to assess the quality of medical care. You may receive a survey from the [employer] regarding your experience in the Emergency Department. I hope that your experience has been completely positive, particularly the medical care that I provided. As such, please participate in the survey; anything less than excellent does not meet my expectations or intentions. [facility] and [address] [place] [address] participate in nationally recognized quality of care measures. If your blood pressure is greater than 120/80, as reported below, we urge that you seek medical care to address the potential of high blood pressure, commonly known as hypertension. Hypertension can be hereditary or can be caused by certain medical conditions, pain, stress, or \"white coat syndrome. \"       Please make an appointment with your health care provider(s) for follow up of your Emergency Department visit. VITALS:   Patient Vitals for the past 8 hrs:   Temp Pulse Resp BP SpO2   08/07/18 1828 98 °F (36.7 °C) 76 16 156/88 97 %          Thank you for allowing us to provide you with medical care today. We realize that you have many choices for your emergency care needs. Please choose us in the future for any continued health care needs. Stephanie Buckner, [facility].   Office: [phone]            No results found for this or any previous visit (from the past 24 hour(s)). No results found.

## 2018-08-07 NOTE — ED NOTES

## 2018-08-07 NOTE — ED PROVIDER NOTES
HPI Comments: The pt is a 48year old female who presents with complaints of \"thick blood\" during the last two plasma donations. She states that she wasn't able to donate today due to viscosity of blood and wants to make sure that she is not having acute complications. No family or personal history of VTE. Pt denies fevers, chills, night sweats, chest pain, pressure, SOB, LEUNG, PND, orthopnea, abdominal pain, n/v/d, melena, hematuria, dysuria, constipation, HA, dizziness, and syncope. No leg swelling or prolonged immobility. Past Medical History:  No date: Adverse effect of anesthesia      Comment: DELAYED AWAKENING  2016: Cyst of skin      Comment: right side of face  2016: Essential hypertension      Comment: PT DENIES    Past Surgical History:  No date: HX  SECTION  No date: HX HEENT      Comment: POLYPS ON VOCAL CORDS  No date: HX HYSTERECTOMY  2016: HX OTHER SURGICAL      Comment: PARATHYROIDECTOMY  No date: HX PARTIAL HYSTERECTOMY    PCP:  Elmer Harper MD        Patient is a 48 y.o. female presenting with other event. The history is provided by the patient. Other   Pertinent negatives include no chest pain, no abdominal pain, no headaches and no shortness of breath.         Past Medical History:   Diagnosis Date    Adverse effect of anesthesia     DELAYED AWAKENING    Cyst of skin 2016    right side of face    Essential hypertension 2016    PT DENIES       Past Surgical History:   Procedure Laterality Date    HX  SECTION      HX HEENT      POLYPS ON VOCAL CORDS    HX HYSTERECTOMY      HX OTHER SURGICAL  2016    PARATHYROIDECTOMY    HX PARTIAL HYSTERECTOMY           Family History:   Problem Relation Age of Onset    Hypertension Mother     Diabetes Father     Breast Cancer Maternal Aunt     Anesth Problems Neg Hx        Social History     Social History    Marital status: SINGLE     Spouse name: N/A    Number of children: N/A    Years of education: N/A Occupational History    Not on file. Social History Main Topics    Smoking status: Current Every Day Smoker     Packs/day: 0.50     Types: Cigarettes    Smokeless tobacco: Never Used    Alcohol use 3.0 oz/week     6 Standard drinks or equivalent per week      Comment: OCCASIONAL    Drug use: No    Sexual activity: Yes     Partners: Male     Other Topics Concern    Not on file     Social History Narrative         ALLERGIES: Latex    Review of Systems   Constitutional: Negative for activity change, appetite change, chills, diaphoresis, fatigue, fever and unexpected weight change. HENT: Negative for congestion, ear pain, rhinorrhea, sinus pressure, sore throat and tinnitus. Eyes: Negative for photophobia, pain, discharge and visual disturbance. Respiratory: Negative for apnea, cough, choking, chest tightness, shortness of breath, wheezing and stridor. Cardiovascular: Negative for chest pain, palpitations and leg swelling. Gastrointestinal: Negative for abdominal pain, constipation, diarrhea, nausea and vomiting. Endocrine: Negative for polydipsia, polyphagia and polyuria. Genitourinary: Negative for decreased urine volume, dyspareunia, dysuria, enuresis, flank pain, frequency, hematuria and urgency. Musculoskeletal: Negative for arthralgias, back pain, gait problem, myalgias and neck pain. Skin: Negative for color change, pallor, rash and wound. Allergic/Immunologic: Negative for immunocompromised state. Neurological: Negative for dizziness, seizures, syncope, weakness, light-headedness and headaches. Hematological: Does not bruise/bleed easily. Psychiatric/Behavioral: Negative for agitation and confusion. The patient is not nervous/anxious.         Vitals:    08/07/18 1828   BP: 156/88   Pulse: 76   Resp: 16   Temp: 98 °F (36.7 °C)   SpO2: 97%   Weight: 123.4 kg (272 lb 0.8 oz)   Height: 5' 8\" (1.727 m)            Physical Exam   Constitutional: She is oriented to person, place, and time. She appears well-developed and well-nourished. No distress. HENT:   Head: Normocephalic. Right Ear: External ear normal.   Left Ear: External ear normal.   Mouth/Throat: Oropharynx is clear and moist. No oropharyngeal exudate. Eyes: Conjunctivae and EOM are normal. Pupils are equal, round, and reactive to light. Right eye exhibits no discharge. Left eye exhibits no discharge. No scleral icterus. Neck: Normal range of motion. Neck supple. No JVD present. No tracheal deviation present. No thyromegaly present. Cardiovascular: Normal rate, regular rhythm, normal heart sounds and intact distal pulses. Exam reveals no gallop and no friction rub. No murmur heard. Pulmonary/Chest: Effort normal and breath sounds normal. No stridor. No respiratory distress. She has no wheezes. She has no rales. She exhibits no tenderness. Abdominal: Soft. Bowel sounds are normal. She exhibits no distension and no mass. There is no tenderness. There is no rebound and no guarding. Musculoskeletal: Normal range of motion. She exhibits no edema or tenderness. Lymphadenopathy:     She has no cervical adenopathy. Neurological: She is alert and oriented to person, place, and time. She displays normal reflexes. No cranial nerve deficit. Coordination normal.   Skin: Skin is warm and dry. No rash noted. She is not diaphoretic. No erythema. No pallor. Psychiatric: She has a normal mood and affect. Her behavior is normal. Judgment and thought content normal.   Nursing note and vitals reviewed. MDM  Number of Diagnoses or Management Options  Evaluation by medical service required:      Amount and/or Complexity of Data Reviewed  Discuss the patient with other providers: yes    Risk of Complications, Morbidity, and/or Mortality  General comments:    - stable, ambulatory pt in NAD    Patient Progress  Patient progress: stable        ED Course       Procedures           7:22 PM  Pt has been reevaluated.  There are no new complaints, changes, or physical findings at this time. Medications have been reviewed w/ pt and/or family. Pt and/or family's questions have been answered. Pt and/or family expressed good understanding of the dx/tx/rx and is in agreement with plan of care. Pt instructed and agreed to f/u w/ PCP and to return to ED upon further deterioration. Pt is ready for discharge. LABORATORY TESTS:  No results found for this or any previous visit (from the past 12 hour(s)). IMAGING RESULTS:  No orders to display     No results found. MEDICATIONS GIVEN:  Medications - No data to display    IMPRESSION:  1. Evaluation by medical service required        PLAN:  1. Discharge Medication List as of 8/7/2018  7:17 PM        2. Follow-up Information     Follow up With Details Comments Contact Taya Tolentino MD In 2 days  [facility]  [address] [facility] EMERGENCY DEP  If symptoms worsen, As needed [address] [facility]  [phone]        3.  Supportive care    Return to ED if worse         Ramiro Melo NP  7:22 PM

## 2018-08-07 NOTE — ED TRIAGE NOTES
Triage Note: Patient arrives to ER for \"thick blood. \" Pt attempted to donate plasma this afternoon but was unable to because her blood was \"too thick. They weren't able to put the blood back in me. \" Denies CP or SOB.

## 2018-08-08 ENCOUNTER — PATIENT OUTREACH (OUTPATIENT)
Dept: OTHER | Age: 53
End: 2018-08-08

## 2018-08-08 NOTE — PROGRESS NOTES
Transition Of Care Note    Patient discharged from [facility] on  for \"Thick Blood\". Medical History:     Past Medical History:   Diagnosis Date    Adverse effect of anesthesia     DELAYED AWAKENING    Cyst of skin 2016    right side of face    Essential hypertension 2016    PT DENIES       Care Manager contacted the patient by telephone to perform post ED discharge assessment. Verified  and zip code with patient as identifiers. Provided introduction to self, and explanation of the Nurse Care Manager role. Red Flags:  Not identified. Did review signs/symptoms of blood clots. Condition Focused Assessment:   Patient reports the following: None at this time. Medication:   New Medications at Discharge: No new medications  Changed Medications at Discharge: None   Discontinued Medications at Discharge: None  Current Outpatient Prescriptions   Medication Sig    diclofenac EC (VOLTAREN) 75 mg EC tablet Take  by mouth.  naltrexone-buPROPion (CONTRAVE) 8-90 mg TbER ER tablet Week 1 1 tab PO QAM, Week 2 1QAM 1QHS, Week 3 2QAM 1 QHS, Week 4 & beyond 2QAM 2QHS  Indications: WEIGHT LOSS MANAGEMENT FOR OBESE PATIENT (BMI >= 30)     No current facility-administered medications for this visit. There are no discontinued medications. Performed medication reconciliation with patient, and patient verbalizes understanding of administration of home medications. There were no barriers to obtaining medications identified at this time. Inpatient RRAT score: NA  Was this a readmission?  no   Patient stated reason for the readmission: None    Barriers/Support system:  patient      Barriers/Challenges to Care []  Decline in memory    []  Language barrier     []  Emotional                  []  Limited mobility  []  Lack of motivation     [] Vision, hearing or cognitive impairment [x]  Knowledge [] Financial Barriers []  Lack of support  []  Pain []  Other []  None    CM Identified  Problems (contributing problems for risk for readmission)  1. Appointment adherence  2. Medication adherence        Discharge Instructions :  Reviewed discharge instructions with patient. Reviewed signs and symptoms of clotting, encouraged patient to reach out to PCP if questions arise. No symptoms identified while at ED or at this time. Patient verbalizes understanding of discharge instructions and follow-up care. Advance Care Planning:   Patient was offered the opportunity to discuss advance care planning:  no     Does patient have an Advance Directive:  no   If no, did you provide information on Caring Connections?  no     PCP/Specialist follow up: Patient scheduled to follow up with Elmer Harper MD on 8/13. Future Appointments  Date Time Provider Chiki Gee   8/13/2018 1:30 PM Elmer Harper MD [facility]      Reviewed red flags with patient, and patient verbalizes understanding. Patient given an opportunity to ask questions. No other clinical/social/functional needs noted. The patient agrees to contact the PCP office for questions related to their healthcare. The patient expressed thanks, offered no additional questions and ended the call.

## 2018-08-13 ENCOUNTER — OFFICE VISIT (OUTPATIENT)
Dept: INTERNAL MEDICINE CLINIC | Age: 53
End: 2018-08-13

## 2018-08-13 VITALS
SYSTOLIC BLOOD PRESSURE: 118 MMHG | OXYGEN SATURATION: 97 % | DIASTOLIC BLOOD PRESSURE: 78 MMHG | HEART RATE: 71 BPM | WEIGHT: 266.9 LBS | BODY MASS INDEX: 40.45 KG/M2 | TEMPERATURE: 98 F | HEIGHT: 68 IN | RESPIRATION RATE: 18 BRPM

## 2018-08-13 DIAGNOSIS — R70.1 ABNORMAL PLASMA VISCOSITY: Primary | ICD-10-CM

## 2018-08-13 NOTE — MR AVS SNAPSHOT
[address] [facility], [address] [facility] 
[phone] Patient: Jessica Brooks MRN: [id number] Visit Information Date & Time Provider Department Dept. Phone Encounter #  
 8/13/2018  1:30 PM [place] [id number] [facility]  Upcoming Health Maintenance Date Due Pneumococcal 19-64 Medium Risk (1 of 1 - PPSV23) 7/30/1986 PAP AKA CERVICAL CYTOLOGY 6/1/2018 Influenza Age 5 to Adult 8/1/2018 BREAST CANCER SCRN MAMMOGRAM 9/12/2019 DTaP/Tdap/Td series (2 - Td) 10/3/2026 Allergies as of 8/13/2018  Review Complete On: 8/13/2018 By: Madelyn Garcia MD  
  
 Severity Noted Reaction Type Reactions Latex  02/27/2015    Itching Current Immunizations  Reviewed on 2/27/2015 Name Date Influenza Nasal Vaccine 10/5/2017, 11/15/2015, 11/1/2014 Tdap 10/3/2016 Not reviewed this visit You Were Diagnosed With   
  
 Codes Comments Abnormal plasma viscosity    -  Primary ICD-10-CM: R70.1 ICD-9-CM: 790.99 Vitals BP Pulse Temp Resp Height(growth percentile) Weight(growth percentile) 118/78 (BP 1 Location: Left arm, BP Patient Position: Sitting) 71 98 °F (36.7 °C) (Oral) 18 5' 8\" (1.727 m) 266 lb 14.4 oz (121.1 kg) SpO2 BMI OB Status Smoking Status 97% 40.58 kg/m2 Hysterectomy Current Every Day Smoker Vitals History BMI and BSA Data Body Mass Index Body Surface Area 40.58 kg/m 2 2.41 m 2 Preferred Pharmacy Pharmacy Name Phone [email or web address] [address] [phone] Your Updated Medication List  
  
   
This list is accurate as of 8/13/18  1:46 PM.  Always use your most recent med list.  
  
  
  
  
 diclofenac EC 75 mg EC tablet Commonly known as:  VOLTAREN Take  by mouth. To-Do List   
 08/13/2018   Lab:  CBC WITH AUTOMATED DIFF   
  
 08/13/2018 Lab:  IMMUNOELECTROPHORESIS (IMMUNOFIX.)   
  
 08/13/2018 Lab:  VISCOSITY Patient Instructions Have blood work analysis done. Don't donate plasma until results known. Introducing [facility]! Dear Hardin Ganser: 
Thank you for requesting a [employer] account. Our records indicate that you already have an active [employer] account. You can access your account anytime at [email or web address] Did you know that you can access your hospital and ER discharge instructions at any time in [employer]? You can also review all of your test results from your hospital stay or ER visit. Additional Information If you have questions, please visit the Frequently Asked Questions section of the [employer] website at [email or web address] Remember, [employer] is NOT to be used for urgent needs. For medical emergencies, dial 911. Now available from your iPhone and Android! Please provide this summary of care documentation to your next provider. Your primary care clinician is listed as April Somers. If you have any questions after today's visit, please call [phone].

## 2018-08-13 NOTE — PROGRESS NOTES
Reviewed record in preparation for visit and have obtained necessary documentation. Identified pt with two pt identifiers(name and ). Chief Complaint   Patient presents with   [address] [place] [address] ED Follow-up     [facility] 2018 Patient went to er r/t blood being thick when she went to [facility] and they send her to er for f/u. Health Maintenance Due   Topic Date Due    Pneumococcal Vaccine (1 of  - PPSV23) 1986    Cervical Cancer Screening  2018    Flu Vaccine  2018       Coordination of Care Questionnaire:  :     1) Have you been to an emergency room, urgent care clinic since your last visit? yes See reason for visit.    Hospitalized since your last visit? no             2) Have you seen or consulted any other health care providers outside of [address] [place] [address] since your last visit? no  (Include any pap smears or colon screenings in this section.)

## 2018-08-13 NOTE — PROGRESS NOTES
Subjective:     Chief Complaint   Patient presents with   [facility] ED Follow-up     [facility] 2018 Patient went to er r/t blood being thick when she went to [facility] and they send her to er for f/u. She  is a 48y.o. year old female who presents for evaluation. Blood was too thick (plasma) where she tried to do plasma donation. Went to ED and sent her. Mother had MI at age 44. No known blood dyscrasias in family. Historical Data    Past Medical History:   Diagnosis Date    Adverse effect of anesthesia     DELAYED AWAKENING    Cyst of skin     right side of face    Essential hypertension 2016    PT DENIES        Past Surgical History:   Procedure Laterality Date    HX  SECTION      HX HEENT      POLYPS ON VOCAL CORDS    HX HYSTERECTOMY      HX OTHER SURGICAL  2016    PARATHYROIDECTOMY    HX PARTIAL HYSTERECTOMY          Outpatient Encounter Prescriptions as of 2018   Medication Sig Dispense Refill    diclofenac EC (VOLTAREN) 75 mg EC tablet Take  by mouth.  [DISCONTINUED] naltrexone-buPROPion (CONTRAVE) 8-90 mg TbER ER tablet Week 1 1 tab PO QAM, Week 2 1QAM 1QHS, Week 3 2QAM 1 QHS, Week 4 & beyond 2QAM 2QHS  Indications: WEIGHT LOSS MANAGEMENT FOR OBESE PATIENT (BMI >= 30) 100 Tab 1     No facility-administered encounter medications on file as of 2018. Allergies   Allergen Reactions    Latex Itching        Social History     Social History    Marital status: SINGLE     Spouse name: N/A    Number of children: N/A    Years of education: N/A     Occupational History    Not on file.      Social History Main Topics    Smoking status: Current Every Day Smoker     Packs/day: 0.50     Types: Cigarettes    Smokeless tobacco: Never Used    Alcohol use 3.0 oz/week     6 Standard drinks or equivalent per week      Comment: OCCASIONAL    Drug use: No    Sexual activity: Yes     Partners: Male     Other Topics Concern    Not on file     Social History Narrative Review of Systems  A comprehensive review of systems was negative except for that written in the HPI. Objective:     Vitals:    08/13/18 1332   BP: 118/78   Pulse: 71   Resp: 18   Temp: 98 °F (36.7 °C)   TempSrc: Oral   SpO2: 97%   Weight: 266 lb 14.4 oz (121.1 kg)   Height: 5' 8\" (1.727 m)     Pleasant AAF in no acute distress. Eyes: Fundi benign. Cardiopulmonary exam is normal.    ASSESSMENT / PLAN:   1. Abnormal plasma viscosity  · Discussed need for work-up. - VISCOSITY; Future  - IMMUNOELECTROPHORESIS [facility].); Future  - CBC WITH AUTOMATED DIFF; Future    Patient Instructions   Have blood work analysis done. Don't donate plasma until results known. Follow-up Disposition: Not on File   Advised her to call back or return to office if symptoms worsen/change/persist.  Discussed expected course/resolution/complications of diagnosis in detail with patient. Medication risks/benefits/costs/interactions/alternatives discussed with patient. She was given an after visit summary which includes diagnoses, current medications, & vitals. She expressed understanding with the diagnosis and plan.

## 2018-08-16 LAB
BASOPHILS # BLD AUTO: 0 X10E3/UL (ref 0–0.2)
BASOPHILS NFR BLD AUTO: 0 %
EOSINOPHIL # BLD AUTO: 0.2 X10E3/UL (ref 0–0.4)
EOSINOPHIL NFR BLD AUTO: 2 %
ERYTHROCYTE [DISTWIDTH] IN BLOOD BY AUTOMATED COUNT: 13.5 % (ref 12.3–15.4)
HCT VFR BLD AUTO: 34.9 % (ref 34–46.6)
HGB BLD-MCNC: 11 G/DL (ref 11.1–15.9)
IGA SERPL-MCNC: 225 MG/DL (ref 87–352)
IGG SERPL-MCNC: 1118 MG/DL (ref 700–1600)
IGM SERPL-MCNC: 42 MG/DL (ref 26–217)
IMM GRANULOCYTES # BLD: 0 X10E3/UL (ref 0–0.1)
IMM GRANULOCYTES NFR BLD: 0 %
LYMPHOCYTES # BLD AUTO: 2.7 X10E3/UL (ref 0.7–3.1)
LYMPHOCYTES NFR BLD AUTO: 29 %
MCH RBC QN AUTO: 31.3 PG (ref 26.6–33)
MCHC RBC AUTO-ENTMCNC: 31.5 G/DL (ref 31.5–35.7)
MCV RBC AUTO: 99 FL (ref 79–97)
MONOCYTES # BLD AUTO: 0.7 X10E3/UL (ref 0.1–0.9)
MONOCYTES NFR BLD AUTO: 8 %
NEUTROPHILS # BLD AUTO: 5.6 X10E3/UL (ref 1.4–7)
NEUTROPHILS NFR BLD AUTO: 61 %
PLATELET # BLD AUTO: 289 X10E3/UL (ref 150–379)
PROT PATTERN SERPL IFE-IMP: NORMAL
RBC # BLD AUTO: 3.51 X10E6/UL (ref 3.77–5.28)
VISC SER: 1.7 REL.SALINE (ref 1.6–1.9)
WBC # BLD AUTO: 9.2 X10E3/UL (ref 3.4–10.8)

## 2018-08-17 ENCOUNTER — PATIENT OUTREACH (OUTPATIENT)
Dept: OTHER | Age: 53
End: 2018-08-17

## 2018-08-17 NOTE — PROGRESS NOTES
Telephonic outreach to patient to follow up with patient, who is currently enrolled in [facility]. Patient verified her  and zip code. Patient was at work, so could only speak for a limited time. Patient did see her PCP on , blood work was drawn with results pending. She reports feeling well, just waiting to find out results of blood work and next steps. This care manager will follow up with patient next week to address blood work results.

## 2018-08-24 ENCOUNTER — PATIENT OUTREACH (OUTPATIENT)
Dept: OTHER | Age: 53
End: 2018-08-24

## 2018-08-24 NOTE — PROGRESS NOTES
Telephonic outreach attempt to follow up with patient regarding her blood work, patient is currently enrolled in [employer] [employer]. Left a discreet VM requesting a return call.

## 2018-09-11 ENCOUNTER — PATIENT OUTREACH (OUTPATIENT)
Dept: OTHER | Age: 53
End: 2018-09-11

## 2018-09-11 NOTE — PROGRESS NOTES
Resolving current episode [facility] (Transitions of care complete). No further ED/UC or hospital admissions within 30 days post discharge. Patient attended follow-up appointments as directed. No outreach from patient to [address] [place] [address].

## 2018-10-19 ENCOUNTER — HOSPITAL ENCOUNTER (OUTPATIENT)
Dept: MAMMOGRAPHY | Age: 53
Discharge: HOME | End: 2018-10-19
Attending: INTERNAL MEDICINE
Payer: COMMERCIAL

## 2018-10-19 DIAGNOSIS — Z12.31 VISIT FOR SCREENING MAMMOGRAM: ICD-10-CM

## 2018-10-19 PROCEDURE — 77063 BREAST TOMOSYNTHESIS BI: CPT

## 2019-01-22 ENCOUNTER — OFFICE VISIT (OUTPATIENT)
Dept: INTERNAL MEDICINE CLINIC | Age: 54
End: 2019-01-22

## 2019-01-22 VITALS
WEIGHT: 244 LBS | HEART RATE: 73 BPM | RESPIRATION RATE: 18 BRPM | DIASTOLIC BLOOD PRESSURE: 88 MMHG | TEMPERATURE: 98.1 F | BODY MASS INDEX: 36.98 KG/M2 | OXYGEN SATURATION: 100 % | SYSTOLIC BLOOD PRESSURE: 122 MMHG | HEIGHT: 68 IN

## 2019-01-22 DIAGNOSIS — S81.811D LACERATION OF CALF, RIGHT, SUBSEQUENT ENCOUNTER: Primary | ICD-10-CM

## 2019-01-22 DIAGNOSIS — L08.9 POSTTRAUMATIC WOUND INFECTION: ICD-10-CM

## 2019-01-22 DIAGNOSIS — T14.8XXA POSTTRAUMATIC WOUND INFECTION: ICD-10-CM

## 2019-01-22 RX ORDER — CEPHALEXIN 250 MG/1
250 CAPSULE ORAL 4 TIMES DAILY
Qty: 20 CAP | Refills: 0 | Status: SHIPPED | OUTPATIENT
Start: 2019-01-22 | End: 2019-01-27

## 2019-01-22 NOTE — PROGRESS NOTES
47 yo female in for suture removal from  R calf. She reports she fells at home and cut her calf on the sharp edge of the TV which broke when she fell. This happened on 1/12, and she presented to ED at [facility] for tx of a complex calf laceration closed with 16 sutures. XRs revealed no fx. She was given Td update and Ancef. She works in housekeeping at [employer], and has been out of work since the injury. She reports the incision still bleeds. PE: Overweight BF   T - 98.1   R Calf - horizontal laceration with sutures; dressing removed has small amount bright blood   23 sutures removed from wound with bright oozing and small amount purulent drainage from one suture site above laceration    Imp: Traumatic Laceration R Calf   Post-traumatic wound infection    Plan: (also seen by Dr. Genoveva Chung)   Sutures removed   Keflex for 5 days   Change dressing daily   Keep wound covered at work, but open at home   Note give to return to work tomorrow   OV in 6 days to reassess wound  ___________________________  Expected course of current diagnosed problem(s) as well as expected progression and possible complications, and desired follow up with provider are discussed with patient. Patient is encouraged to be back in touch with any questions or concerns. Patient expresses understanding of plan of care. Patient is given AVS which includes diagnoses, current medications, vitals.

## 2019-01-22 NOTE — PATIENT INSTRUCTIONS
Change dressing on R calf once daily    Leave it open at home if not bleeding    Avoid soaping/scrubbing wound area    Advil, Aleve or Tylenol as needed for discomfort    Report any fever or increase in wound drainage

## 2019-01-22 NOTE — LETTER
NOTIFICATION RETURN TO WORK / SCHOOL 
 
1/22/2019 10:03 AM 
 
Ms. Ani Villanueva [address] [facility] [id number] To Whom It May Concern: 
 
 
Ani Villanueva is currently under the care of Robbie Calderón. She will return to work on: January 23, 2019 for full duty. If there are questions or concerns please have the patient contact our office. Sincerely, Christine Mai NP

## 2019-01-22 NOTE — PROGRESS NOTES
Reviewed record in preparation for visit and have obtained necessary documentation. Identified pt with two pt identifiers(name and ). Health Maintenance Due   Topic    Pneumococcal 19-64 Medium Risk (1 of 1 - PPSV23)    Shingrix Vaccine Age 50> (1 of 2)    PAP AKA CERVICAL CYTOLOGY     Influenza Age 5 to Adult          Chief Complaint   Patient presents with    Suture Removal     Stitches located on right calf. Wt Readings from Last 3 Encounters:   19 244 lb (110.7 kg)   18 266 lb 14.4 oz (121.1 kg)   18 272 lb 0.8 oz (123.4 kg)     Temp Readings from Last 3 Encounters:   18 98 °F (36.7 °C) (Oral)   18 98 °F (36.7 °C)   18 98 °F (36.7 °C) (Oral)     BP Readings from Last 3 Encounters:   18 118/78   18 156/88   18 (!) 122/98     Pulse Readings from Last 3 Encounters:   18 71   18 76   18 80           Learning Assessment:  :     Learning Assessment 2018 10/12/2015 2015   PRIMARY LEARNER Patient Patient Patient   HIGHEST LEVEL OF EDUCATION - PRIMARY LEARNER  DID NOT GRADUATE HIGH SCHOOL DID NOT GRADUATE HIGH SCHOOL DID NOT GRADUATE HIGH SCHOOL   BARRIERS PRIMARY LEARNER NONE NONE NONE   CO-LEARNER CAREGIVER - No No   PRIMARY LANGUAGE ENGLISH ENGLISH ENGLISH    NEED - No -   LEARNER PREFERENCE PRIMARY DEMONSTRATION LISTENING DEMONSTRATION   LEARNING SPECIAL TOPICS - no -   ANSWERED BY patient patient patient   RELATIONSHIP SELF SELF SELF       Depression Screening:  :     PHQ over the last two weeks 2019   Little interest or pleasure in doing things Not at all   Feeling down, depressed, irritable, or hopeless Not at all   Total Score PHQ 2 0       Fall Risk Assessment:  :     No flowsheet data found. Abuse Screening:  :     Abuse Screening Questionnaire 2018 10/12/2015 2015   Do you ever feel afraid of your partner?  N N N   Are you in a relationship with someone who physically or mentally threatens you? N N N   Is it safe for you to go home? Kamari Gaston       Coordination of Care Questionnaire:  :     1) Have you been to an emergency room, urgent care clinic since your last visit? Yes [facility] 1/12/2019   Hospitalized since your last visit? no             2) Have you seen or consulted any other health care providers outside of [address] [place] [address] since your last visit? no  (Include any pap smears or colon screenings in this section.)    3) Do you have an Advance Directive on file? no    4) Are you interested in receiving information on Advance Directives? NO      Patient is accompanied by  I have received verbal consent from Rosita Whelan to discuss any/all medical information while they are present in the room.

## 2019-01-25 ENCOUNTER — TELEPHONE (OUTPATIENT)
Dept: INTERNAL MEDICINE CLINIC | Age: 54
End: 2019-01-25

## 2019-01-25 ENCOUNTER — HOSPITAL ENCOUNTER (EMERGENCY)
Age: 54
Discharge: HOME | End: 2019-01-25
Attending: EMERGENCY MEDICINE
Payer: COMMERCIAL

## 2019-01-25 VITALS
WEIGHT: 263.23 LBS | RESPIRATION RATE: 18 BRPM | SYSTOLIC BLOOD PRESSURE: 131 MMHG | DIASTOLIC BLOOD PRESSURE: 82 MMHG | OXYGEN SATURATION: 100 % | BODY MASS INDEX: 39.89 KG/M2 | HEIGHT: 68 IN | HEART RATE: 73 BPM | TEMPERATURE: 97.7 F

## 2019-01-25 DIAGNOSIS — L08.9 WOUND INFECTION: Primary | ICD-10-CM

## 2019-01-25 DIAGNOSIS — T14.8XXA WOUND INFECTION: Primary | ICD-10-CM

## 2019-01-25 DIAGNOSIS — L03.115 CELLULITIS OF RIGHT LOWER EXTREMITY: ICD-10-CM

## 2019-01-25 PROCEDURE — 99282 EMERGENCY DEPT VISIT SF MDM: CPT

## 2019-01-25 RX ORDER — DOXYCYCLINE HYCLATE 100 MG
100 TABLET ORAL 2 TIMES DAILY
Qty: 14 TAB | Refills: 0 | Status: SHIPPED | OUTPATIENT
Start: 2019-01-25 | End: 2019-02-01

## 2019-01-25 NOTE — ED NOTES
9237 NP reviewed discharge instructions with the patient. The patient verbalized understanding. Patient was giving adaptive dressing, per NP. Patient ambulated out of ED by herself. No complaints or concerns noted.

## 2019-01-25 NOTE — ED PROVIDER NOTES
CC: Wound dehiscence, right calf Seen at [address] [place] [address] on  for a right leg injury. Struck leg on the edge of a TV. Had 20 stitches placed. 10 days later the PCP removed the stitches. Wound opened yesterday and needs to be closed. Denies systemic symptoms of infection. The history is provided by the patient. No  was used. Wound Dehiscence This is a recurrent problem. Pertinent negatives include no fever, no diarrhea, no nausea, no vomiting, no constipation, no dysuria, no headaches, no chest pain and no back pain. Skin Infection Pertinent negatives include no chest pain, no abdominal pain, no headaches and no shortness of breath. Past Medical History:  
Diagnosis Date  Adverse effect of anesthesia DELAYED AWAKENING  
 Cyst of skin   
 right side of face  Essential hypertension 2016 PT DENIES Past Surgical History:  
Procedure Laterality Date  HX BREAST BIOPSY Left Per Pt:  or  - Surgical Bx - Benign  HX BREAST BIOPSY Right Per Pt:  or  - Surgical Bx - Benign  HX  SECTION    
 HX HEENT    
 POLYPS ON VOCAL CORDS  
 HX HYSTERECTOMY  HX OTHER SURGICAL  2016 PARATHYROIDECTOMY  HX PARTIAL HYSTERECTOMY Family History:  
Problem Relation Age of Onset  Hypertension Mother  Diabetes Father  Breast Cancer Maternal Aunt Age Unknown  Anesth Problems Neg Hx Social History Socioeconomic History  Marital status: SINGLE Spouse name: Not on file  Number of children: Not on file  Years of education: Not on file  Highest education level: Not on file Social Needs  Financial resource strain: Not on file  Food insecurity - worry: Not on file  Food insecurity - inability: Not on file  Transportation needs - medical: Not on file  Transportation needs - non-medical: Not on file Occupational History  Not on file Tobacco Use  
  Smoking status: Current Every Day Smoker Packs/day: 0.50 Types: Cigarettes  Smokeless tobacco: Never Used Substance and Sexual Activity  Alcohol use: Yes Alcohol/week: 3.0 oz Types: 6 Standard drinks or equivalent per week Comment: OCCASIONAL  
 Drug use: No  
 Sexual activity: Yes  
  Partners: Male Other Topics Concern  Not on file Social History Narrative  Not on file ALLERGIES: Latex Review of Systems Constitutional: Negative for appetite change, chills and fever. HENT: Negative. Respiratory: Negative for cough, shortness of breath and wheezing. Cardiovascular: Negative for chest pain. Gastrointestinal: Negative for abdominal pain, constipation, diarrhea, nausea and vomiting. Genitourinary: Negative for dysuria and urgency. Musculoskeletal: Negative for back pain. Skin: Positive for wound. Negative for color change and rash. Neurological: Negative for dizziness and headaches. Psychiatric/Behavioral: Negative. All other systems reviewed and are negative. Vitals:  
 01/25/19 1452 Pulse: 96 SpO2: 100% Physical Exam  
Constitutional: She appears well-developed and well-nourished. HENT:  
Head: Atraumatic. Eyes: EOM are normal.  
Neck: No tracheal deviation present. Pulmonary/Chest: Effort normal. No respiratory distress. Musculoskeletal: Normal range of motion. Right lower leg: She exhibits tenderness, swelling and edema. She exhibits no bony tenderness, no deformity and no laceration. ~ 13 x 6 cm open wound with necrosis in the base of the wound. Periwound cellulitis. Some odor. 2 sutures remain on the edge of the wound. Attached to the eschar. See photo Neurological: She is alert. Skin: Skin is warm and dry. Psychiatric: She has a normal mood and affect. Her behavior is normal. Judgment and thought content normal.  
Nursing note and vitals reviewed. MDM Procedures DC with Santyl Collagenase, local wound care. Continue keflex. Start doxycycline for cellulitis. Wound care center referral 
4:03 PM 
The patient has been reevaluated. The patient is ready for discharge. The patient's signs, symptoms, diagnosis, and discharge instructions have been discussed and the patient/ family has conveyed their understanding. The patient is to follow up as recommended or return to the ED should their symptoms worsen. Plan has been discussed and the patient is in agreement. LABORATORY TESTS: 
Labs Reviewed - No data to display IMAGING RESULTS: 
No results found. MEDICATIONS GIVEN: 
Medications - No data to display IMPRESSION: 
1. Wound infection PLAN: 
1. Current Discharge Medication List  
  
START taking these medications Details  
doxycycline (VIBRA-TABS) 100 mg tablet Take 1 Tab by mouth two (2) times a day for 7 days. Qty: 14 Tab, Refills: 0  
  
collagenase (SANTYL) 250 unit/gram ointment Apply  to affected area daily. On right calf 
Qty: 30 g, Refills: 0  
  
  
 
2. Follow-up Information Follow up With Specialties Details Why Contact Info [facility] [address] 
[phone] Concha Villalpando NP Family Practice Schedule an appointment as soon as possible for a visit  [address]  [address] [facility] [address] [phone] [facility] EMERGENCY DEP Emergency Medicine  As needed, If symptoms worsen [address] [facility] [address] 
[phone] 3. Return to ED for new or worsening symptoms Jose Oconnell NP

## 2019-01-25 NOTE — TELEPHONE ENCOUNTER
Becca Hernandez 1965       Pt called to inform Karolynn Pallas that wound had reopened and was \"oozing\" and red. Spoke with nurse Buzz Bernstein and Barbara Driver NP. Advised pt to go to ER or urgent care to have wound sutured again.

## 2019-01-25 NOTE — DISCHARGE INSTRUCTIONS
Thank you for allowing us to care for you today. Please follow-up with your Primary Care provider in the next 2-3 days if your symptoms do not improve. Plan for home:     Please take the doxycycline twice daily for the next 7 days. Please note the doxycycline and can cause sensitivity to the sun. Please wear protective clothing or sunscreen when out of the sun. Please call, return to the ED or see your Primary Care Provider  if there are signs or symptoms of wound infection: fevers, chills, sweating, fast heartbeat, or wounds with increased warmth, increased redness, malodor (bad smelling), purulent (pus) drainage and/or pain that is increased, new or difficult to control. No Swimming, tub baths or hot tubs until your wounds have healed. Wound Care: Apply collagenase oint to wound after bathing. Cover with Adaptic. Then dry dressing. Change dressing daily. Follow-up in the wound clinic as soon as possible. Come back to the ER if you have worsening symptoms, fevers over 100.9, shaking chills, nausea or vomiting. Patient Education        Opened Cut After Surgery: Care Instructions  Your Care Instructions  Sometimes a cut made during surgery opens when it isn't supposed to. This may be because of an infection or another problem that keeps the cut's edges from staying together. The doctor has checked your open cut. He or she may have put a dressing in the cut but left it open to heal. This lets the cut heal from the bottom up. Your doctor may have given you a vacuum device to take home that helps close the cut. A cut may be left open when it is infected or likely to become infected. This is because closing the cut may make an existing infection worse and a new infection more likely. You will have a bandage. Follow-up care is a key part of your treatment and safety. Be sure to make and go to all appointments, and call your doctor if you are having problems.  It's also a good idea to know your test results and keep a list of the medicines you take. How can you care for yourself at home? · You may shower with soap and water. Your doctor will tell you when it is safe to use a bathtub or go swimming. · If your doctor told you how to care for your cut, follow your doctor's instructions. If you did not get instructions, follow this general advice:  ? Wash around the cut with clean water 2 times a day. Don't use hydrogen peroxide or alcohol, which can slow healing. · Avoid any activity that could cause your cut to get worse. For example, if your cut is in the belly, avoid lifting anything that would make you strain. This may include heavy grocery bags and milk containers, a heavy briefcase or backpack, cat litter or dog food bags, a vacuum , or a child. · Take pain medicines exactly as directed. ? If the doctor gave you a prescription medicine for pain, take it as prescribed. ? If you are not taking a prescription pain medicine, ask your doctor if you can take an over-the-counter medicine. · If your doctor prescribed antibiotics, take them as directed. Do not stop taking them just because you feel better. You need to take the full course of antibiotics. · If your cut is packed (gauze is put into the cut), follow your doctor's instructions on how often and how to repack the cut. A home health worker may do this for you. When should you call for help? Call your doctor now or seek immediate medical care if:    · The cut gets bigger.     · You can see organs under the skin.     · You have new pain, or your pain gets worse.     · The cut starts to bleed, and blood soaks through the bandage.  Oozing small amounts of blood is normal.     · The skin near the cut is cold or pale or changes color.     · You have tingling, weakness, or numbness near the cut.     · You have trouble moving the area near the cut.     · You have symptoms of infection, such as:  ? Increased pain, swelling, warmth, or redness around the cut.  ? Red streaks leading from the cut.  ? Pus draining from the cut.  ? A fever.    Watch closely for changes in your health, and be sure to contact your doctor if:    · The cut is not closing (getting smaller).     · You do not get better as expected. Where can you learn more? Go to http://cecelia-miah.info/. Enter C093 in the search box to learn more about \"Opened Cut After Surgery: Care Instructions. \"  Current as of: September 23, 2018  Content Version: 11.9  © 0106-6440 [employer]. Care instructions adapted under license by [employer] (which disclaims liability or warranty for this information). If you have questions about a medical condition or this instruction, always ask your healthcare professional. [address] any warranty or liability for your use of this information.

## 2019-01-25 NOTE — ED TRIAGE NOTES
S/P sutures to RLE on 1/12/19 at [facility]. Sutures removed by PCP 10 days later, 1/23/19. Wound dehiscence with redness and swelling onset after suture removal. Denies fever.

## 2019-01-28 ENCOUNTER — OFFICE VISIT (OUTPATIENT)
Dept: INTERNAL MEDICINE CLINIC | Age: 54
End: 2019-01-28

## 2019-01-28 VITALS
HEIGHT: 68 IN | SYSTOLIC BLOOD PRESSURE: 108 MMHG | BODY MASS INDEX: 36.4 KG/M2 | DIASTOLIC BLOOD PRESSURE: 76 MMHG | HEART RATE: 80 BPM | WEIGHT: 240.2 LBS | RESPIRATION RATE: 18 BRPM | TEMPERATURE: 97.8 F | OXYGEN SATURATION: 98 %

## 2019-01-28 DIAGNOSIS — S81.801D OPEN WOUND OF LOWER EXTREMITY WITH COMPLICATION, RIGHT, SUBSEQUENT ENCOUNTER: Primary | ICD-10-CM

## 2019-01-28 RX ORDER — CEPHALEXIN 250 MG/1
500 CAPSULE ORAL 4 TIMES DAILY
COMMUNITY
End: 2019-02-05

## 2019-01-28 NOTE — PROGRESS NOTES
Reviewed record in preparation for visit and have obtained necessary documentation. Identified pt with two pt identifiers(name and ). Health Maintenance Due   Topic    Pneumococcal 19-64 Medium Risk (1 of 1 - PPSV23)    Shingrix Vaccine Age 50> (1 of 2)    PAP AKA CERVICAL CYTOLOGY          Chief Complaint   Patient presents with    Wound Check      1 wk         Wt Readings from Last 3 Encounters:   19 240 lb 3.2 oz (109 kg)   19 263 lb 3.7 oz (119.4 kg)   19 244 lb (110.7 kg)     Temp Readings from Last 3 Encounters:   19 97.7 °F (36.5 °C)   19 98.1 °F (36.7 °C) (Oral)   18 98 °F (36.7 °C) (Oral)     BP Readings from Last 3 Encounters:   19 131/82   19 122/88   18 118/78     Pulse Readings from Last 3 Encounters:   19 73   19 73   18 71           Learning Assessment:  :     Learning Assessment 2018 10/12/2015 2015   PRIMARY LEARNER Patient Patient Patient   HIGHEST LEVEL OF EDUCATION - PRIMARY LEARNER  DID NOT GRADUATE HIGH SCHOOL DID NOT GRADUATE HIGH SCHOOL DID NOT GRADUATE HIGH SCHOOL   BARRIERS PRIMARY LEARNER NONE NONE NONE   CO-LEARNER CAREGIVER - No No   PRIMARY LANGUAGE ENGLISH ENGLISH ENGLISH    NEED - No -   LEARNER PREFERENCE PRIMARY DEMONSTRATION LISTENING DEMONSTRATION   LEARNING SPECIAL TOPICS - no -   ANSWERED BY patient patient patient   RELATIONSHIP SELF SELF SELF       Depression Screening:  :     PHQ over the last two weeks 2019   Little interest or pleasure in doing things Not at all   Feeling down, depressed, irritable, or hopeless Not at all   Total Score PHQ 2 0       Fall Risk Assessment:  :     No flowsheet data found. Abuse Screening:  :     Abuse Screening Questionnaire 2018 10/12/2015 2015   Do you ever feel afraid of your partner? N N N   Are you in a relationship with someone who physically or mentally threatens you? N N N   Is it safe for you to go home?  Krista Shea Coordination of Care Questionnaire:  :     1) Have you been to an emergency room, urgent care clinic since your last visit? yes [facility] 1/25/2019 DX: Right leg bleeding and painful  Hospitalized since your last visit? no             2) Have you seen or consulted any other health care providers outside of [address] [place] [address] since your last visit? no  (Include any pap smears or colon screenings in this section.)    3) Do you have an Advance Directive on file? no    4) Are you interested in receiving information on Advance Directives? NO      Patient is accompanied by self I have received verbal consent from Jatin Che to discuss any/all medical information while they are present in the room.

## 2019-01-28 NOTE — PROGRESS NOTES
49 yo female returns for wound recheck. Sutures were removed on 1/22 and wound was not closed in areas. She presented to the ED 3 days later because the wound \"opened up\". Two retained sutures were removed, she was prescribed Doxycycline and Collagenase (she has not picked this up due to insurance coverage); she is taking the Doxycycline. She was referred to Tong Cabrera, and will be calling for an appointment. She reports the wound is still draining. PE: Overweight BF is alert   T - 97.8   Dressing on R calf wound removed - dsg is wet with serosanguinous drainage    Imp: Continued drainage for R Calf Wound    Plan: New dsg applied   She called while here and made an appointment at the [address] [place] [address] for tomorrow   FMLA and Insurance papers left here for completion   She will continue Doxycycline and complete the Cephalexin today or tomorrow   She is back to work  ______________________________  Expected course of current diagnosed problem(s) as well as expected progression and possible complications, and desired follow up with provider are discussed with patient. Patient is encouraged to be back in touch with any questions or concerns. Patient expresses understanding of plan of care. Patient is given AVS which includes diagnoses, current medications, vitals.

## 2019-01-29 ENCOUNTER — TELEPHONE (OUTPATIENT)
Dept: INTERNAL MEDICINE CLINIC | Age: 54
End: 2019-01-29

## 2019-01-29 ENCOUNTER — HOSPITAL ENCOUNTER (OUTPATIENT)
Dept: WOUND CARE | Age: 54
Discharge: HOME | End: 2019-01-29
Payer: COMMERCIAL

## 2019-01-29 VITALS
TEMPERATURE: 97.8 F | DIASTOLIC BLOOD PRESSURE: 93 MMHG | SYSTOLIC BLOOD PRESSURE: 122 MMHG | RESPIRATION RATE: 16 BRPM | HEART RATE: 82 BPM

## 2019-01-29 PROBLEM — I96 SKIN FLAP NECROSIS (HCC): Status: ACTIVE | Noted: 2019-01-29

## 2019-01-29 PROBLEM — T86.828 SKIN FLAP NECROSIS (HCC): Status: ACTIVE | Noted: 2019-01-29

## 2019-01-29 PROCEDURE — 74011000250 HC RX REV CODE- 250: Performed by: EMERGENCY MEDICINE

## 2019-01-29 PROCEDURE — 11043 DBRDMT MUSC&/FSCA 1ST 20/<: CPT

## 2019-01-29 PROCEDURE — 11046 DBRDMT MUSC&/FSCA EA ADDL: CPT

## 2019-01-29 RX ADMIN — Medication 10 ML: at 14:00

## 2019-01-29 NOTE — TELEPHONE ENCOUNTER
Pt wants to know if her [facility] Paperwork was filled out. Pt would like to pick it up today due to transportation issues.  Pt can be reached at [phone]

## 2019-01-29 NOTE — WOUND CARE
01/29/19 1422 Wound Leg Lower Posterior;Right Date First Assessed/Time First Assessed: 01/29/19 1341   POA: Yes  Wound Type: Trauma  Location: Leg Lower  Orientation: Posterior;Right Cleansing and Cleansing Agents  Normal saline 
(J&J baby shampoo) Dressing Type Applied Foam;Gauze wrap (radha);Gauze;Special tape (comment) (Hydro fera blue classic, Double tubi F ) Patient was discharged with family to home and was ambulatory. In stable condition with c/o pain:_0_/10_

## 2019-01-29 NOTE — WOUND CARE
01/29/19 1342 Wound Leg Lower Posterior;Right Date First Assessed/Time First Assessed: 01/29/19 1341   POA: Yes  Wound Type: Trauma  Location: Leg Lower  Orientation: Posterior;Right DRESSING STATUS Clean, dry, and intact DRESSING TYPE Dry dressing Non-Pressure Injury Full thickness (subcut/muscle) Wound Length (cm) 6.7 cm Wound Width (cm) 11.6 cm Wound Depth (cm) 1.4 Wound Surface area (cm^2) 77.72 cm^2 Condition of Base Granulation;Eschar Drainage Amount  Small Drainage Color Serosanguinous Wound Odor None Periwound Skin Condition Intact Cleansing and Cleansing Agents  Normal saline

## 2019-01-29 NOTE — PROGRESS NOTES
Chief Complaint (CC): wound from a TV stand R leg/calf 2weeks ago. Rody Brown Present Illness (PI): Patient cut back of R calf on a TV  mid January and had it sewed in ER. The repair skin flap necrosed distally, hurts now and insurance wouldn't pay for the Santyl. .She is on Keflex from her doctor. Pertinent Past History (PMedHx): as above, has had Parathyroid surgery for a chemical problem but no other health problems. . 
Also noted:                       
 Medications and Allergies: as per [facility]. I have reviewed and concur. Illnesses: as per '[address] [facility] [place]' recorded data noted today. Surgeries and Injuries: as per '[address] [place] [address]' recorded data noted today. Review of Systems (ROS): 
                      Integumentary: Other than as noted in 'PI'; skin hair and nails normal for age, with no new rash, lumps, bumps, eruptions or bleeding. Lymph: no new prominent nodes or drainage near lymph nodes. Bones, Joints, and Muscles: Other than as noted in 'PI' no new fractures, dislocations, weakness or pain. Hurts on in the wound area. Hematopoietic: no new bleeding or bruising or anemia changes. . 
                      Eyes: no recent trauma or inflammation. 0. Eye glasses. 0. Intra Occular Lens Implants (IOLI) Ears: Hearing is unchanged and usually good. Nose: no new drainage, rhinorhea or epistaxis. Mouth, and throat: no soreness, drainage or lesions. no. Dentures. Neck: no new masses, drainage or pain Respiratory; no hemoptysis, current shortness of breath or pain with breath. Cardiovascular: No chest pain, palpitation or tachycardia.  . 
                      Gastrointestinal: no recent change in appetite, stools or food tolerance. No jaundice, hematemesis, vomiting or diarrhea Genito-Urinary: urine color, frequency, sensation unchanged Neurologic: no syncope, dizzyness or unusual sensations. Psychologic and Mental Status: no change in mood, sleep or memory recently Social History: '[facility]' data today is noted. 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 Family History: '[facility]' data today is noted. [facility] Physical Exam:  
  
General:  alert high BMI, anxious . Head, Eyes, Ears, Nose and Throat: normocephalic, PERRLA EOMI TMs, mucosa benign. Neck: supple without masses or adenopathy. No bruit. Chest: full excursion without deformity, . Lungs: clear to ausc. Heart: RSR 3/6 CORY along LSB greatest at 4th LICS. Abdomen: soft round uncomfortable, no mass. Neurology: Cranial nerves 2-12 grossly intact . Reflexes: BJ, CBJ, KJ, AJ 1+. Vascular:  
                      Pulses:                                            R                    L  
                                            Radial                        ++. ++. 
                                            Femoral                     +.                 +. 
                                            DP                             ++. ++. 
                                            PT                             +.                 +. 
Extremities: RLE posterior calf large black necrotic flap with thin watery drainage. . 
Other: [facility] Vital signs and data recorded in '[facility]' for this patient today is noted, reviewed and considered. Patient notes today: some pain today. .  
  
Procedure Note Name of Procedure: sharp debridement. PreOp diagnosis: failed skin flap. [facility] Anaesthesia: Lidocaine; topical  
Description: using sharp scissors I removed all blackened skin and subQ, I then used a sharp curette and removed all necrotic fat and fascia in the subQ tissue, effecting a clean bleeding base. Annamary Ripa Most extreme level of debridement: fascia. Post debridement dimensions changed as noted:  
 Depth, add 20.0 mm;  
 Width, add 04.0 mm Length, add 10cm. Annamary Ripa Blood Loss: 5. CCs. Bleeding abated post treatment . Post Op Diagnosis, and condition: as above, . Follow Up Plan: RTC 1 week, daily baby shampoo clean, hydrofera dressing, double tubi. Annamary Ripa Specimens: 0. Counseled regarding/Discussed: as above, needs to be out of dirty environment for a short time. Clinical considerations: alert to infection, pressure, nutrition, long discussion in each of these areas. . 
Future: should heal over 10-12 weeks. Annamary Ripa

## 2019-01-30 ENCOUNTER — DOCUMENTATION ONLY (OUTPATIENT)
Dept: INTERNAL MEDICINE CLINIC | Age: 54
End: 2019-01-30

## 2019-01-30 NOTE — TELEPHONE ENCOUNTER
Follow up call to pt - verified self and birth date. Pt advised that her forms were ready for  from the front office staff. Requested that she bring valid ID to obtain forms. Office hours provided.

## 2019-01-30 NOTE — PROGRESS NOTES
Pt called the office she wants us to mail her paperwork that she had dorothea fill out for her due to her not have any transportation to the office.

## 2019-02-05 ENCOUNTER — HOSPITAL ENCOUNTER (OUTPATIENT)
Dept: WOUND CARE | Age: 54
Discharge: HOME | End: 2019-02-05
Payer: COMMERCIAL

## 2019-02-05 VITALS
RESPIRATION RATE: 16 BRPM | TEMPERATURE: 98.2 F | SYSTOLIC BLOOD PRESSURE: 120 MMHG | DIASTOLIC BLOOD PRESSURE: 78 MMHG | HEART RATE: 80 BPM

## 2019-02-05 PROCEDURE — 11043 DBRDMT MUSC&/FSCA 1ST 20/<: CPT

## 2019-02-05 NOTE — PROGRESS NOTES
Shelbie Jean LPN Licensed Nurse Wound Care Signed Date of Service:  02/05/19 1348 []Samantha copied text []Santi for details 
 
 
  
  02/05/19 1341 Wound Leg Lower Posterior;Right Date First Assessed/Time First Assessed: 01/29/19 1341   POA: Yes  Wound Type: Trauma  Location: Leg Lower  Orientation: Posterior;Right Dressing Status  Clean, dry, and intact Dressing Type  Foam;Gauze;Gauze wrap (radha) Wound Length (cm) 6 cm Wound Width (cm) 11 cm Wound Depth (cm) 1.2 Wound Surface area (cm^2) 66 cm^2 Change in Wound Size % 15.08 Condition of Base Eschar;Granulation Drainage Amount  Small Drainage Color Serosanguinous Wound Odor None Periwound Skin Condition Intact Cleansing and Cleansing Agents  Normal saline  
  
 
  
  
 
 
 
I have noted, and reviewed today's data for this patient in [facility] and concur with same. The focused physical exam, other physical findings, Medical history, Review of Symptoms and Medications today remains unchanged except as noted below. Patient notes today: I don't know if its depression or nerves or what, I'm out of work,  is away for work, I get tremulous at night. Lesion/Wound, focused exam on Presentation today: 
RLE posterior calf much smaller ulcer some necrotic black eschar in base and lower edge with slough over all surface. Procedure: Wound # ulcer RLE. Procedure name: sharp excisional debridement. Anaesthesia: Lidocaine; topical   
Description: using a sharp curette and sharp scissors I excised all nonviable eschar and debris from wound edges and base to effect a clean bleeding base over all. . 
Maximum Level/depth of debridement: fascia mid calf. Post debridement dimensions changed as noted:  
 Depth, add 3.0 mm;  
 Width, add 0.0 mm Length, add 0.0 mm. Blood Loss: 3 CCs. Bleeding abated post treatment . Post Procedure Condition/ Diagnosis: as above, good progress, but significant anxiety/ depression in this situation. . 
Follow up and Future plans today: plan Seroquel 50mg nightly, Ultram prn severe pain, daily clean aquacel. RTC 1 week. Cheryl Abraham Specimens: 0. Patient Counseled regarding/Discussed: good progress, situational and mood/affect issues do affect self care here. . 
Clinical Considerations: alert to infection and pressure. Cheryl Rosario

## 2019-02-05 NOTE — WOUND CARE
02/05/19 1447 Wound Leg Lower Posterior;Right Date First Assessed/Time First Assessed: 01/29/19 1341   POA: Yes  Wound Type: Trauma  Location: Leg Lower  Orientation: Posterior;Right Dressing Type Applied Gauze;Gauze wrap (radha) (HFBC moistened with saline double tubi F)

## 2019-02-05 NOTE — WOUND CARE
02/05/19 1341 Wound Leg Lower Posterior;Right Date First Assessed/Time First Assessed: 01/29/19 1341   POA: Yes  Wound Type: Trauma  Location: Leg Lower  Orientation: Posterior;Right Dressing Status  Clean, dry, and intact Dressing Type  Foam;Gauze;Gauze wrap (radha) Wound Length (cm) 6 cm Wound Width (cm) 11 cm Wound Depth (cm) 1.2 Wound Surface area (cm^2) 66 cm^2 Change in Wound Size % 15.08 Condition of Base Eschar;Granulation Drainage Amount  Small Drainage Color Serosanguinous Wound Odor None Periwound Skin Condition Intact Cleansing and Cleansing Agents  Normal saline

## 2019-02-07 ENCOUNTER — DOCUMENTATION ONLY (OUTPATIENT)
Dept: INTERNAL MEDICINE CLINIC | Age: 54
End: 2019-02-07

## 2019-02-12 ENCOUNTER — HOSPITAL ENCOUNTER (OUTPATIENT)
Dept: WOUND CARE | Age: 54
Discharge: HOME | End: 2019-02-12
Payer: COMMERCIAL

## 2019-02-12 VITALS
HEART RATE: 88 BPM | DIASTOLIC BLOOD PRESSURE: 80 MMHG | SYSTOLIC BLOOD PRESSURE: 126 MMHG | RESPIRATION RATE: 16 BRPM | TEMPERATURE: 98.1 F

## 2019-02-12 PROCEDURE — 11043 DBRDMT MUSC&/FSCA 1ST 20/<: CPT

## 2019-02-12 NOTE — WOUND CARE
OUTPATIENT WOUND CARE DISCHARGE NOTE 
 
 
 02/12/19 1115 Wound Leg Lower Posterior;Right Date First Assessed/Time First Assessed: 01/29/19 1341   POA: Yes  Wound Type: Trauma  Location: Leg Lower  Orientation: Posterior;Right Cleansing and Cleansing Agents  Normal saline; Soap and water 
(Mariah 86.) Dressing Type Applied Collagens/cell matrix;Silver products;Foam;ABD pad;Compression dressing (Monse ag, ABD, Roll Gauze, Double layer tubi- F) Wound Dressing Applied - Per order, as noted above. Pain -  Denies pain at time of Discharge. Ambulatory Aid- None Accompanied by - Self Follow Up Appointments - 2 weeks. Discharge Instructions Reviewed. Instructed to call The [address] [place] [address] Road with any questions or concerns. Patient Verbalizes understanding.

## 2019-02-12 NOTE — WOUND CARE
02/12/19 1020 Wound Leg Lower Posterior;Right Date First Assessed/Time First Assessed: 01/29/19 1341   POA: Yes  Wound Type: Trauma  Location: Leg Lower  Orientation: Posterior;Right Wound Length (cm) 5.9 cm Wound Width (cm) 11 cm Wound Depth (cm) 1.2 Wound Surface area (cm^2) 64.9 cm^2 Change in Wound Size % 16.5 Condition of Base Epithelializing;Granulation;Slough Drainage Amount  Small Drainage Color Serosanguinous Wound Odor None Periwound Skin Condition Intact Cleansing and Cleansing Agents  (Allan's Baby Shampoo) Visit Vitals /80 (BP 1 Location: Right arm, BP Patient Position: At rest) Pulse 88 Temp 98.1 °F (36.7 °C) Resp 16

## 2019-02-12 NOTE — PROGRESS NOTES
Kevin Simon, RN Registered Nurse Wound Care Wound Care Signed Date of Service:  02/12/19 1115 []Hide copied text []Santi for details OUTPATIENT WOUND CARE DISCHARGE NOTE 
  
  
  02/12/19 1115 Wound Leg Lower Posterior;Right Date First Assessed/Time First Assessed: 01/29/19 1341   POA: Yes  Wound Type: Trauma  Location: Leg Lower  Orientation: Posterior;Right Cleansing and Cleansing Agents  Normal saline; Soap and water 
([address] Dressing Type Applied Collagens/cell matrix;Silver products;Foam;ABD pad;Compression dressing (Monse ag, ABD, Roll Gauze, Double layer tubi- F)   
  
  
  
  
Wound Dressing Applied - Per order, as noted above. 
  
Pain -  Denies pain at time of Discharge. 
  
Ambulatory Aid- None 
  
Accompanied by - Self 
  
Follow Up Appointments - 2 weeks. 
  
Discharge Instructions Reviewed. Instructed to call The [address] [place] [address] Road with any questions or concerns. Patient Verbalizes understanding.  
  
  
  
 
  
  
 
 
I have noted, and reviewed today's data for this patient in [facility] and concur with same. The focused physical exam, other physical findings, Medical history, Review of Symptoms and Medications today remains unchanged except as noted below. Patient notes today: I'm doing better, . Lesion/Wound, focused exam on Presentation today: R calf wound ulcer with slough over all surface, streaks of blackened necrotic eschar also, skin edges pink and advancing. Procedure: Wound # RLE skin avulsion. Procedure name: sharp excisional debridement. Anaesthesia: Lidocaine; topical   
Description: using a sharp curette I excised all non viable tissue to effect a clean bleeding base. Maximum Level/depth of debridement: subQ. Post debridement dimensions changed as noted:  
 Depth, add 1.0 mm;  
 Width, add 0.0 mm Length, add 0.0 mm. Blood Loss: 3 CCs. Bleeding abated post treatment . Post Procedure Condition/ Diagnosis: as above, good progress, . Follow up and Future plans today: continue with luh, aquacel cover, . Specimens: 0. Patient Counseled regarding/Discussed: as above, no progressing, . Clinical Considerations: alert to pressure,  Infection, trauma. Shaji Jackson

## 2019-02-26 ENCOUNTER — HOSPITAL ENCOUNTER (OUTPATIENT)
Dept: WOUND CARE | Age: 54
Discharge: HOME | End: 2019-02-26
Payer: COMMERCIAL

## 2019-02-26 VITALS
HEART RATE: 105 BPM | RESPIRATION RATE: 16 BRPM | SYSTOLIC BLOOD PRESSURE: 110 MMHG | TEMPERATURE: 98.1 F | DIASTOLIC BLOOD PRESSURE: 75 MMHG

## 2019-02-26 PROCEDURE — 74011000250 HC RX REV CODE- 250: Performed by: EMERGENCY MEDICINE

## 2019-02-26 PROCEDURE — 11042 DBRDMT SUBQ TIS 1ST 20SQCM/<: CPT

## 2019-02-26 RX ADMIN — Medication: at 10:21

## 2019-02-26 NOTE — PROGRESS NOTES
Anna Barnes RN Registered Nurse Wound Care Wound Care Signed Date of Service:  02/26/19 1019 []Hide copied text []Santi for details 
 
 
  
  02/26/19 1011 Wound Leg Lower Posterior;Right Date First Assessed/Time First Assessed: 01/29/19 1341   POA: Yes  Wound Type: Trauma  Location: Leg Lower  Orientation: Posterior;Right Dressing Status  Clean, dry, and intact; Removed Dressing Type  Gauze;Gauze wrap (radha); Special tape (comment) (HFBR,exudry) Wound Length (cm) 5 cm Wound Width (cm) 8 cm Wound Depth (cm) 1.5 Wound Surface area (cm^2) 40 cm^2 Change in Wound Size % 48.53 Condition of Base Granulation;Slough Drainage Amount  Small Drainage Color Serosanguinous Wound Odor None Periwound Skin Condition Intact Cleansing and Cleansing Agents  Normal saline  
  
Visit Vitals /75 Pulse (!) 105 Temp 98.1 °F (36.7 °C) Resp 16  
  
RLE Peripheral Vascular Capillary Refill: Less than/equal to 3 seconds (02/26/19 1009) Color: Appropriate for race (02/26/19 1009) Temperature: Warm (02/26/19 1009) Sensation: Present (02/26/19 1009) Pedal Pulse: Palpable (02/26/19 1009) Circumference of Calf (cm): 44 cm (02/26/19 1009) Location of Measurement (Calf): Mid  (02/26/19 1009) Circumference of Ankle (cm): 23 cm (02/26/19 1009) Location of Measurement (Ankle): Upper  (02/26/19 1009)   
  
 
  
  
 
 
 
I have noted, and reviewed today's data for this patient in [facility] and concur with same. The focused physical exam, other physical findings, Medical history, Review of Symptoms and Medications today remains unchanged except as noted below. Patient notes today: Its better, they want me back to work by April 10. Lesion/Wound, focused exam on Presentation today: RLE calf ulcer with slough over surface much smaller ulcer area, good skin edge growth. . 
 
Procedure: Wound # RLE skin avulsion. Procedure name: sharp excisional debridement. Anaesthesia: Lidocaine; topical   
Description: using a #7 sharp curette I excised all non viable tissue to effect a clean bleeding base. Maximum Level/depth of debridement: fascia. Post debridement dimensions changed as noted:  
 Depth, add 1.0 mm;  
 Width, add 0.0 mm Length, add 0.0 mm. Blood Loss: 2 CCs. Bleeding abated post treatment . Post Procedure Condition/ Diagnosis: no pain today,  Much improved. Follow up and Future plans today: RTC 2 weeks, continue current luh, hydrofera (as needed for drainage). Specimens: 0. Patient Counseled regarding/Discussed: progress, may be able to return to work April 10, even if not completely closed. . 
Clinical Considerations: alert to infection. Annrommel Ripa

## 2019-02-26 NOTE — WOUND CARE
02/26/19 1011 Wound Leg Lower Posterior;Right Date First Assessed/Time First Assessed: 01/29/19 1341   POA: Yes  Wound Type: Trauma  Location: Leg Lower  Orientation: Posterior;Right Dressing Status  Clean, dry, and intact; Removed Dressing Type  Gauze;Gauze wrap (radha); Special tape (comment) (HFBR,exudry) Wound Length (cm) 5 cm Wound Width (cm) 8 cm Wound Depth (cm) 1.5 Wound Surface area (cm^2) 40 cm^2 Change in Wound Size % 48.53 Condition of Base Granulation;Slough Drainage Amount  Small Drainage Color Serosanguinous Wound Odor None Periwound Skin Condition Intact Cleansing and Cleansing Agents  Normal saline Visit Vitals /75 Pulse (!) 105 Temp 98.1 °F (36.7 °C) Resp 16 RLE Peripheral Vascular Capillary Refill: Less than/equal to 3 seconds (02/26/19 1009) Color: Appropriate for race (02/26/19 1009) Temperature: Warm (02/26/19 1009) Sensation: Present (02/26/19 1009) Pedal Pulse: Palpable (02/26/19 1009) Circumference of Calf (cm): 44 cm (02/26/19 1009) Location of Measurement (Calf): Mid  (02/26/19 1009) Circumference of Ankle (cm): 23 cm (02/26/19 1009) Location of Measurement (Ankle): Upper  (02/26/19 1009)

## 2019-03-19 ENCOUNTER — HOSPITAL ENCOUNTER (OUTPATIENT)
Dept: WOUND CARE | Age: 54
Discharge: HOME | End: 2019-03-19
Payer: COMMERCIAL

## 2019-03-19 VITALS
HEART RATE: 88 BPM | DIASTOLIC BLOOD PRESSURE: 95 MMHG | TEMPERATURE: 97.1 F | SYSTOLIC BLOOD PRESSURE: 155 MMHG | RESPIRATION RATE: 16 BRPM

## 2019-03-19 PROCEDURE — 74011000250 HC RX REV CODE- 250: Performed by: EMERGENCY MEDICINE

## 2019-03-19 PROCEDURE — 11042 DBRDMT SUBQ TIS 1ST 20SQCM/<: CPT

## 2019-03-19 RX ADMIN — Medication: at 10:39

## 2019-03-19 NOTE — PROGRESS NOTES
Ellen Weaver RN   Registered Nurse   Wound Care   Wound Care   Signed   Date of Service:  03/19/19 1040                        []Hide copied text    []Santi for details           03/19/19 1027   Wound Leg Lower Posterior;Right   Date First Assessed/Time First Assessed: 01/29/19 1341   POA: Yes  Wound Type: Trauma  Location: Leg Lower  Orientation: Posterior;Right   Dressing Status  Clean, dry, and intact   Dressing Type  ABD pad;Gauze wrap (radha)   Wound Length (cm) 2.5 cm   Wound Width (cm) 5.5 cm   Wound Depth (cm) 0.3   Wound Surface area (cm^2) 13.75 cm^2   Change in Wound Size % 82.31   Condition of Base Granulation   Drainage Amount  Small    Drainage Color Serosanguinous   Wound Odor None   Periwound Skin Condition Intact        Visit Vitals  BP (!) 155/95 (BP 1 Location: Right arm, BP Patient Position: Sitting)   Pulse 88   Temp 97.1 °F (36.2 °C)   Resp 16                       I have noted, and reviewed today's data for this patient in [facility] and concur with same. The focused physical exam, other physical findings, Medical history, Review of Symptoms and Medications today remains unchanged except as noted below. Patient notes today: doing ok, need to know about work by the 10th of April. Lesion/Wound, focused exam on Presentation today: RLE calf avulsion wound with slough over all ulcer which is 60% smaller. Procedure:   Wound # avulsion wound RLE. Procedure name: sharp debridement. Anaesthesia: Lidocaine; topical    Description: using a #7 sharp curette I excised all non viable tissue to effect a clean bleedinig base. Maximum Level/depth of debridement: subQ. Post debridement dimensions changed as noted:    Depth, add 1.0 mm;    Width, add 0.0 mm   Length, add 0.0 mm. Blood Loss: 2 CCs. Bleeding abated post treatment . Post Procedure Condition/ Diagnosis: some pain after debridement, resolved with reapp of xylocaine.   Follow up and Future plans today: RTC 3 weeks, continue current dressing. Specimens: 0. Patient Counseled regarding/Discussed: as above, with progress by next visit to nearly closed. .  Clinical Considerations: alert to infection, trauma.

## 2019-03-19 NOTE — WOUND CARE
Visit Vitals  BP (!) 155/95 (BP 1 Location: Right arm, BP Patient Position: Sitting)   Pulse 88   Temp 97.1 °F (36.2 °C)   Resp 16        03/19/19 1027   Wound Leg Lower Posterior;Right   Date First Assessed/Time First Assessed: 01/29/19 1341   POA: Yes  Wound Type: Trauma  Location: Leg Lower  Orientation: Posterior;Right   Dressing Status  Clean, dry, and intact   Dressing Type  ABD pad;Gauze wrap (radha)   Wound Length (cm) 2.5 cm   Wound Width (cm) 5.5 cm   Wound Depth (cm) 0.3   Wound Surface area (cm^2) 13.75 cm^2   Change in Wound Size % 82.31   Condition of Base Granulation   Drainage Amount  Small    Drainage Color Serosanguinous   Wound Odor None   Periwound Skin Condition Intact

## 2019-03-19 NOTE — WOUND CARE
03/19/19 1027   Wound Leg Lower Posterior;Right   Date First Assessed/Time First Assessed: 01/29/19 1341   POA: Yes  Wound Type: Trauma  Location: Leg Lower  Orientation: Posterior;Right   Dressing Status  Clean, dry, and intact   Dressing Type  ABD pad;Gauze wrap (radha)   Wound Length (cm) 2.5 cm   Wound Width (cm) 5.5 cm   Wound Depth (cm) 0.3   Wound Surface area (cm^2) 13.75 cm^2   Change in Wound Size % 82.31   Condition of Base Granulation   Drainage Amount  Small    Drainage Color Serosanguinous   Wound Odor None   Periwound Skin Condition Intact       Visit Vitals  BP (!) 155/95 (BP 1 Location: Right arm, BP Patient Position: Sitting)   Pulse 88   Temp 97.1 °F (36.2 °C)   Resp 16

## 2019-03-19 NOTE — WOUND CARE
Discharge Condition:stale  Ambulatory Status:ambulatory  Discharge Destination:home  Transportation: personal car  Accompanied by: self

## 2019-04-09 ENCOUNTER — HOSPITAL ENCOUNTER (OUTPATIENT)
Dept: WOUND CARE | Age: 54
Discharge: HOME | End: 2019-04-09
Payer: COMMERCIAL

## 2019-04-09 VITALS
HEART RATE: 66 BPM | SYSTOLIC BLOOD PRESSURE: 151 MMHG | TEMPERATURE: 97.9 F | DIASTOLIC BLOOD PRESSURE: 90 MMHG | RESPIRATION RATE: 16 BRPM

## 2019-04-09 PROCEDURE — 11043 DBRDMT MUSC&/FSCA 1ST 20/<: CPT

## 2019-04-09 PROCEDURE — 11042 DBRDMT SUBQ TIS 1ST 20SQCM/<: CPT

## 2019-04-09 PROCEDURE — 74011000250 HC RX REV CODE- 250: Performed by: EMERGENCY MEDICINE

## 2019-04-09 RX ADMIN — Medication: at 10:00

## 2019-04-09 NOTE — WOUND CARE
Discharge Condition:stable  Ambulatory Status:ambulatory  Discharge Destination:home  Transportation: Private  Accompanied by: self

## 2019-04-09 NOTE — PROGRESS NOTES
Katey Kaufman RN   Registered Nurse      Wound Care   Signed   Date of Service:  04/09/19 4460                        []Hide copied text    []Santi for details           04/09/19 0955   Wound Leg Lower Posterior;Right   Date First Assessed/Time First Assessed: 01/29/19 1341   POA: Yes  Wound Type: Trauma  Location: Leg Lower  Orientation: Posterior;Right   Dressing Status  Breakthrough drainage;Removed   Dressing Type  Gauze   Wound Length (cm) 1.1 cm   Wound Width (cm) 3.2 cm   Wound Depth (cm) 0.1   Wound Surface area (cm^2) 3.52 cm^2   Change in Wound Size % 95.47   Condition of Base Pink;Slough   Condition of Edges Closed   Drainage Amount  Small    Drainage Color Serosanguinous   Wound Odor None   Periwound Skin Condition Intact   Cleansing and Cleansing Agents  Normal saline         Visit Vitals  /90 (BP 1 Location: Right arm, BP Patient Position: Sitting)   Pulse 66   Temp 97.9 °F (36.6 °C)   Resp 16                                  I have noted, and reviewed today's data for this patient in [facility] and concur with same. The focused physical exam, other physical findings, Medical history, Review of Symptoms and Medications today remains unchanged except as noted below. Patient notes today: I'm better, not sure about work environment with this wound. Lesion/Wound, focused exam on Presentation today: RLE ulcer over calf much smaller, thin keratin surround with slough over ulcer base, thick scar surround forming. .    Procedure:   Wound # RLE avulsion injury. Procedure name: sharp excisional debridement. Anaesthesia: Lidocaine; topical    Description: using a sharp curette I excised all non viable tissue to effect a clean bleeding base. Tissue Level/depth of debridement: subQ. Post debridement dimensions changed as noted:    Depth, add 1.0 mm;    Width, add 0.0 mm   Length, add 0.0 mm. Blood Loss: 1 CCs. Bleeding abated post treatment .    Post Procedure Condition/ Diagnosis: 1/10 pain today. Follow up and Future plans today: RTC 3 weeks off work until, continue dressings. Specimens: 0. Patient Counseled regarding/Discussed: very good progress. .  Clinical Considerations: alert to infection. .    Dx Codes: I46.926.

## 2019-04-09 NOTE — WOUND CARE
04/09/19 0955   Wound Leg Lower Posterior;Right   Date First Assessed/Time First Assessed: 01/29/19 1341   POA: Yes  Wound Type: Trauma  Location: Leg Lower  Orientation: Posterior;Right   Dressing Status  Breakthrough drainage;Removed   Dressing Type  Gauze   Wound Length (cm) 1.1 cm   Wound Width (cm) 3.2 cm   Wound Depth (cm) 0.1   Wound Surface area (cm^2) 3.52 cm^2   Change in Wound Size % 95.47   Condition of Base Pink;Slough   Condition of Edges Closed   Drainage Amount  Small    Drainage Color Serosanguinous   Wound Odor None   Periwound Skin Condition Intact   Cleansing and Cleansing Agents  Normal saline       Visit Vitals  /90 (BP 1 Location: Right arm, BP Patient Position: Sitting)   Pulse 66   Temp 97.9 °F (36.6 °C)   Resp 16

## 2019-04-30 ENCOUNTER — HOSPITAL ENCOUNTER (OUTPATIENT)
Dept: WOUND CARE | Age: 54
Discharge: HOME | End: 2019-04-30
Payer: COMMERCIAL

## 2019-04-30 VITALS
RESPIRATION RATE: 16 BRPM | SYSTOLIC BLOOD PRESSURE: 146 MMHG | HEART RATE: 66 BPM | TEMPERATURE: 98.2 F | DIASTOLIC BLOOD PRESSURE: 86 MMHG

## 2019-04-30 PROCEDURE — 99213 OFFICE O/P EST LOW 20 MIN: CPT

## 2019-04-30 PROCEDURE — 74011000250 HC RX REV CODE- 250: Performed by: EMERGENCY MEDICINE

## 2019-04-30 RX ADMIN — Medication: at 11:00

## 2019-04-30 NOTE — WOUND CARE
Discharge Condition:stable  Ambulatory Status: ambulatory  Discharge Destination: home  Transportation: personal car  Accompanied by:  Self    Discharged from clinic since wound has healed.        '

## 2019-04-30 NOTE — WOUND CARE
04/30/19 1010   Wound Leg Lower Posterior;Right   Date First Assessed/Time First Assessed: 01/29/19 1341   POA: Yes  Wound Type: Trauma  Location: Leg Lower  Orientation: Posterior;Right   Dressing Status  Clean, dry, and intact; Removed   Dressing Type  Open to air   Wound Length (cm) 0.1 cm   Wound Width (cm) 0.1 cm   Wound Depth (cm) 0.1   Wound Surface area (cm^2) 0.01 cm^2   Change in Wound Size % 99.99   Condition of Edges Calloused   Drainage Amount  None   Wound Odor None   Periwound Skin Condition Intact       Visit Vitals  /86   Pulse 66   Temp 98.2 °F (36.8 °C)   Resp 16

## 2019-04-30 NOTE — PROGRESS NOTES
Pacheco Ponce, RN   Registered Nurse   Wound Care   Wound Care   Signed   Date of Service:  04/30/19 1015                        []Hide copied text    []Santi for details           04/30/19 1010   Wound Leg Lower Posterior;Right   Date First Assessed/Time First Assessed: 01/29/19 1341   POA: Yes  Wound Type: Trauma  Location: Leg Lower  Orientation: Posterior;Right   Dressing Status  Clean, dry, and intact; Removed   Dressing Type  Open to air   Wound Length (cm) 0.1 cm   Wound Width (cm) 0.1 cm   Wound Depth (cm) 0.1   Wound Surface area (cm^2) 0.01 cm^2   Change in Wound Size % 99.99   Condition of Edges Calloused   Drainage Amount  None   Wound Odor None   Periwound Skin Condition Intact         Visit Vitals  /86   Pulse 66   Temp 98.2 °F (36.8 °C)   Resp 16                            I have noted, and reviewed today's data for this patient in [facility] and concur with same. The focused physical exam, other physical findings, Medical history, Review of Symptoms and Medications today remains unchanged except as noted below. Patient notes today: I'm better. Lesion/Wound, focused exam on Presentation today: RLE posterior calf with 1.2mm remaining superficial ulcer covered. Benign surround, non tender. Procedure:   Wound # skin avulsion. Post Procedure Condition/ Diagnosis: healed wound, doing good self care. Follow up and Future plans today: return as needed, protect the wound 2 more weeks, ok to work (with wound covered), . Specimens: . Patient Counseled regarding/Discussed: as above, . Clinical Considerations: alert to injury, avoid trauma.     Dx Codes: J63.799; E66.01.

## 2019-04-30 NOTE — PROGRESS NOTES
Discharge Condition:stable  Ambulatory Status:ambulatory  Discharge Destination:home  Transportation: private auto  Accompanied by: self

## 2019-05-27 ENCOUNTER — HOSPITAL ENCOUNTER (EMERGENCY)
Age: 54
Discharge: OTHER INSTITUTION | End: 2019-05-27
Attending: EMERGENCY MEDICINE | Admitting: EMERGENCY MEDICINE
Payer: COMMERCIAL

## 2019-05-27 ENCOUNTER — APPOINTMENT (OUTPATIENT)
Dept: CT IMAGING | Age: 54
End: 2019-05-27
Attending: EMERGENCY MEDICINE
Payer: COMMERCIAL

## 2019-05-27 ENCOUNTER — APPOINTMENT (OUTPATIENT)
Dept: ULTRASOUND IMAGING | Age: 54
End: 2019-05-27
Attending: EMERGENCY MEDICINE
Payer: COMMERCIAL

## 2019-05-27 VITALS
SYSTOLIC BLOOD PRESSURE: 149 MMHG | TEMPERATURE: 98.8 F | HEIGHT: 68 IN | RESPIRATION RATE: 17 BRPM | DIASTOLIC BLOOD PRESSURE: 91 MMHG | OXYGEN SATURATION: 97 % | BODY MASS INDEX: 33.88 KG/M2 | WEIGHT: 223.55 LBS | HEART RATE: 94 BPM

## 2019-05-27 DIAGNOSIS — R79.1 ELEVATED INR: ICD-10-CM

## 2019-05-27 DIAGNOSIS — R74.01 TRANSAMINITIS: Primary | ICD-10-CM

## 2019-05-27 DIAGNOSIS — R11.2 NAUSEA AND VOMITING, INTRACTABILITY OF VOMITING NOT SPECIFIED, UNSPECIFIED VOMITING TYPE: ICD-10-CM

## 2019-05-27 LAB
ALBUMIN SERPL-MCNC: 4.3 G/DL (ref 3.5–5)
ALBUMIN/GLOB SERPL: 1 {RATIO} (ref 1.1–2.2)
ALP SERPL-CCNC: 199 U/L (ref 45–117)
ALT SERPL-CCNC: >3500 U/L (ref 12–78)
AMORPH CRY URNS QL MICRO: ABNORMAL
ANION GAP SERPL CALC-SCNC: 6 MMOL/L (ref 5–15)
APAP SERPL-MCNC: <2 UG/ML (ref 10–30)
APPEARANCE UR: ABNORMAL
AST SERPL-CCNC: >2000 U/L (ref 15–37)
BACTERIA URNS QL MICRO: ABNORMAL /HPF
BASOPHILS # BLD: 0 K/UL (ref 0–0.1)
BASOPHILS NFR BLD: 0 % (ref 0–1)
BILIRUB SERPL-MCNC: 4.7 MG/DL (ref 0.2–1)
BILIRUB UR QL CFM: NEGATIVE
BUN SERPL-MCNC: 22 MG/DL (ref 6–20)
BUN/CREAT SERPL: 20 (ref 12–20)
CALCIUM SERPL-MCNC: 9.2 MG/DL (ref 8.5–10.1)
CHLORIDE SERPL-SCNC: 103 MMOL/L (ref 97–108)
CO2 SERPL-SCNC: 27 MMOL/L (ref 21–32)
COLOR UR: ABNORMAL
CREAT SERPL-MCNC: 1.08 MG/DL (ref 0.55–1.02)
DIFFERENTIAL METHOD BLD: ABNORMAL
EOSINOPHIL # BLD: 0 K/UL (ref 0–0.4)
EOSINOPHIL NFR BLD: 0 % (ref 0–7)
EPITH CASTS URNS QL MICRO: ABNORMAL /LPF
ERYTHROCYTE [DISTWIDTH] IN BLOOD BY AUTOMATED COUNT: 12.9 % (ref 11.5–14.5)
GLOBULIN SER CALC-MCNC: 4.2 G/DL (ref 2–4)
GLUCOSE BLD STRIP.AUTO-MCNC: 72 MG/DL (ref 65–100)
GLUCOSE SERPL-MCNC: 81 MG/DL (ref 65–100)
GLUCOSE UR STRIP.AUTO-MCNC: NEGATIVE MG/DL
HAV IGM SER QL: NONREACTIVE
HBV CORE IGM SER QL: NONREACTIVE
HBV SURFACE AG SER QL: <0.1 INDEX
HBV SURFACE AG SER QL: NEGATIVE
HCT VFR BLD AUTO: 45.1 % (ref 35–47)
HCV AB SERPL QL IA: NONREACTIVE
HCV COMMENT,HCGAC: NORMAL
HGB BLD-MCNC: 15.1 G/DL (ref 11.5–16)
HGB UR QL STRIP: ABNORMAL
HYALINE CASTS URNS QL MICRO: ABNORMAL /LPF (ref 0–5)
IMM GRANULOCYTES # BLD AUTO: 0 K/UL (ref 0–0.04)
IMM GRANULOCYTES NFR BLD AUTO: 0 % (ref 0–0.5)
INR PPP: 2.4 (ref 0.9–1.1)
KETONES UR QL STRIP.AUTO: 15 MG/DL
LEUKOCYTE ESTERASE UR QL STRIP.AUTO: ABNORMAL
LIPASE SERPL-CCNC: 153 U/L (ref 73–393)
LYMPHOCYTES # BLD: 0.6 K/UL (ref 0.8–3.5)
LYMPHOCYTES NFR BLD: 5 % (ref 12–49)
MCH RBC QN AUTO: 31.8 PG (ref 26–34)
MCHC RBC AUTO-ENTMCNC: 33.5 G/DL (ref 30–36.5)
MCV RBC AUTO: 94.9 FL (ref 80–99)
MONOCYTES # BLD: 0.4 K/UL (ref 0–1)
MONOCYTES NFR BLD: 3 % (ref 5–13)
NEUTS SEG # BLD: 11.8 K/UL (ref 1.8–8)
NEUTS SEG NFR BLD: 92 % (ref 32–75)
NITRITE UR QL STRIP.AUTO: NEGATIVE
NRBC # BLD: 0 K/UL (ref 0–0.01)
NRBC BLD-RTO: 0 PER 100 WBC
PH UR STRIP: 6 [PH] (ref 5–8)
PLATELET # BLD AUTO: 183 K/UL (ref 150–400)
PMV BLD AUTO: 11.3 FL (ref 8.9–12.9)
POTASSIUM SERPL-SCNC: 3.1 MMOL/L (ref 3.5–5.1)
PROT SERPL-MCNC: 8.5 G/DL (ref 6.4–8.2)
PROT UR STRIP-MCNC: 300 MG/DL
PROTHROMBIN TIME: 23.5 SEC (ref 9–11.1)
RBC # BLD AUTO: 4.75 M/UL (ref 3.8–5.2)
RBC #/AREA URNS HPF: ABNORMAL /HPF (ref 0–5)
RBC MORPH BLD: ABNORMAL
SALICYLATES SERPL-MCNC: <1.7 MG/DL (ref 2.8–20)
SERVICE CMNT-IMP: NORMAL
SODIUM SERPL-SCNC: 136 MMOL/L (ref 136–145)
SP GR UR REFRACTOMETRY: 1.03 (ref 1–1.03)
SP1: NORMAL
SP2: NORMAL
SP3: NORMAL
UROBILINOGEN UR QL STRIP.AUTO: 1 EU/DL (ref 0.2–1)
WBC # BLD AUTO: 12.8 K/UL (ref 3.6–11)
WBC URNS QL MICRO: ABNORMAL /HPF (ref 0–4)

## 2019-05-27 PROCEDURE — 83690 ASSAY OF LIPASE: CPT

## 2019-05-27 PROCEDURE — 36415 COLL VENOUS BLD VENIPUNCTURE: CPT

## 2019-05-27 PROCEDURE — 74011250636 HC RX REV CODE- 250/636

## 2019-05-27 PROCEDURE — 82962 GLUCOSE BLOOD TEST: CPT

## 2019-05-27 PROCEDURE — 74011250636 HC RX REV CODE- 250/636: Performed by: EMERGENCY MEDICINE

## 2019-05-27 PROCEDURE — 96374 THER/PROPH/DIAG INJ IV PUSH: CPT

## 2019-05-27 PROCEDURE — 76705 ECHO EXAM OF ABDOMEN: CPT

## 2019-05-27 PROCEDURE — 74011636320 HC RX REV CODE- 636/320: Performed by: EMERGENCY MEDICINE

## 2019-05-27 PROCEDURE — 80307 DRUG TEST PRSMV CHEM ANLYZR: CPT

## 2019-05-27 PROCEDURE — 85025 COMPLETE CBC W/AUTO DIFF WBC: CPT

## 2019-05-27 PROCEDURE — 96361 HYDRATE IV INFUSION ADD-ON: CPT

## 2019-05-27 PROCEDURE — 80053 COMPREHEN METABOLIC PANEL: CPT

## 2019-05-27 PROCEDURE — 74177 CT ABD & PELVIS W/CONTRAST: CPT

## 2019-05-27 PROCEDURE — 96375 TX/PRO/DX INJ NEW DRUG ADDON: CPT

## 2019-05-27 PROCEDURE — 99285 EMERGENCY DEPT VISIT HI MDM: CPT

## 2019-05-27 PROCEDURE — 99284 EMERGENCY DEPT VISIT MOD MDM: CPT

## 2019-05-27 PROCEDURE — 81001 URINALYSIS AUTO W/SCOPE: CPT

## 2019-05-27 PROCEDURE — 80074 ACUTE HEPATITIS PANEL: CPT

## 2019-05-27 PROCEDURE — 85610 PROTHROMBIN TIME: CPT

## 2019-05-27 RX ORDER — SODIUM CHLORIDE 0.9 % (FLUSH) 0.9 %
10 SYRINGE (ML) INJECTION
Status: COMPLETED | OUTPATIENT
Start: 2019-05-27 | End: 2019-05-27

## 2019-05-27 RX ORDER — SODIUM CHLORIDE 9 MG/ML
150 INJECTION, SOLUTION INTRAVENOUS ONCE
Status: DISCONTINUED | OUTPATIENT
Start: 2019-05-27 | End: 2019-05-27 | Stop reason: HOSPADM

## 2019-05-27 RX ORDER — DIPHENHYDRAMINE HYDROCHLORIDE 50 MG/ML
25 INJECTION, SOLUTION INTRAMUSCULAR; INTRAVENOUS
Status: COMPLETED | OUTPATIENT
Start: 2019-05-27 | End: 2019-05-27

## 2019-05-27 RX ORDER — ACETAMINOPHEN 500 MG
TABLET ORAL
COMMUNITY
End: 2019-08-05

## 2019-05-27 RX ORDER — METOCLOPRAMIDE HYDROCHLORIDE 5 MG/ML
10 INJECTION INTRAMUSCULAR; INTRAVENOUS
Status: COMPLETED | OUTPATIENT
Start: 2019-05-27 | End: 2019-05-27

## 2019-05-27 RX ORDER — ONDANSETRON 2 MG/ML
INJECTION INTRAMUSCULAR; INTRAVENOUS
Status: COMPLETED
Start: 2019-05-27 | End: 2019-05-27

## 2019-05-27 RX ORDER — ONDANSETRON 2 MG/ML
4 INJECTION INTRAMUSCULAR; INTRAVENOUS
Status: COMPLETED | OUTPATIENT
Start: 2019-05-27 | End: 2019-05-27

## 2019-05-27 RX ORDER — ACETAMINOPHEN/DIPHENHYDRAMINE 500MG-25MG
2 TABLET ORAL
COMMUNITY
End: 2019-08-05

## 2019-05-27 RX ADMIN — IOPAMIDOL 100 ML: 755 INJECTION, SOLUTION INTRAVENOUS at 11:40

## 2019-05-27 RX ADMIN — METOCLOPRAMIDE 10 MG: 5 INJECTION, SOLUTION INTRAMUSCULAR; INTRAVENOUS at 09:54

## 2019-05-27 RX ADMIN — DIPHENHYDRAMINE HYDROCHLORIDE 25 MG: 50 INJECTION, SOLUTION INTRAMUSCULAR; INTRAVENOUS at 09:54

## 2019-05-27 RX ADMIN — ONDANSETRON 4 MG: 2 INJECTION INTRAMUSCULAR; INTRAVENOUS at 06:50

## 2019-05-27 RX ADMIN — ONDANSETRON HYDROCHLORIDE 4 MG: 2 INJECTION, SOLUTION INTRAMUSCULAR; INTRAVENOUS at 06:50

## 2019-05-27 RX ADMIN — SODIUM CHLORIDE 1000 ML: 900 INJECTION, SOLUTION INTRAVENOUS at 06:59

## 2019-05-27 RX ADMIN — SODIUM CHLORIDE 1000 ML: 900 INJECTION, SOLUTION INTRAVENOUS at 08:44

## 2019-05-27 RX ADMIN — Medication 10 ML: at 11:40

## 2019-05-27 NOTE — ED PROVIDER NOTES
EMERGENCY DEPARTMENT HISTORY AND PHYSICAL EXAM          Date: 2019  Patient Name: Maximilian Arreola    History of Presenting Illness     Chief Complaint   Patient presents with    Vomiting     onset Saturday       History Provided By: Patient    HPI: Maximilian Arreola is a 48 y.o. female, pmhx HTN, thyroid, who presents ambulatory to the ED c/o vomiting. Patient started Saturday with nausea and notes has been vomiting since. She denies any diarrhea, fevers, chills, sick contact (that she knowns) but admits to decreased urine output as well as decreased stool output over the past few days. Patient specifically denies any recent fevers, chills, CP, SOB, urinary sxs, changes in BM, or headache. PCP: None    Allergies: Latex  Social Hx: +tobacco; works EVS at [employer]    There are no other complaints, changes, or physical findings at this time.      Current Facility-Administered Medications   Medication Dose Route Frequency Provider Last Rate Last Dose    0.9% sodium chloride infusion  150 mL/hr IntraVENous ONCE Termeer, Sara Ware MD           Past History     Past Medical History:  Past Medical History:   Diagnosis Date    Adverse effect of anesthesia     DELAYED AWAKENING    Cyst of skin 2016    right side of face    Essential hypertension 2016    PT DENIES    Thyroid disease     parathyroidectomy       Past Surgical History:  Past Surgical History:   Procedure Laterality Date    HX BREAST BIOPSY Left     Per Pt:  or  - Surgical Bx - Benign    HX BREAST BIOPSY Right     Per Pt:  or  - Surgical Bx - Benign    HX  SECTION      HX HEENT      POLYPS ON VOCAL CORDS    HX HYSTERECTOMY      HX OTHER SURGICAL  2016    PARATHYROIDECTOMY    HX PARTIAL HYSTERECTOMY         Family History:  Family History   Problem Relation Age of Onset    Hypertension Mother     Diabetes Father     Breast Cancer Maternal Aunt         Age Unknown    Kidney Disease Maternal Aunt     Anesth Problems Neg Hx        Social History:  Social History     Tobacco Use    Smoking status: Current Every Day Smoker     Packs/day: 0.50     Types: Cigarettes    Smokeless tobacco: Never Used   Substance Use Topics    Alcohol use: Yes     Alcohol/week: 3.0 oz     Types: 6 Standard drinks or equivalent per week     Frequency: Never     Comment: OCCASIONAL    Drug use: No       Allergies: Allergies   Allergen Reactions    Latex Itching         Review of Systems   Review of Systems   Constitutional: Negative for activity change, appetite change, chills, fever and unexpected weight change. HENT: Negative for congestion. Eyes: Negative for pain and visual disturbance. Respiratory: Negative for cough and shortness of breath. Cardiovascular: Negative for chest pain. Gastrointestinal: Positive for constipation, nausea and vomiting. Negative for abdominal pain and diarrhea. Genitourinary: Positive for decreased urine volume. Negative for dysuria. Musculoskeletal: Negative for back pain. Skin: Negative for rash. Neurological: Negative for headaches. Physical Exam   Physical Exam   Constitutional: She is oriented to person, place, and time. She appears well-developed and well-nourished. Middle aged female appearing in moderate distress due to nausea   HENT:   Head: Normocephalic and atraumatic. Mouth/Throat: Oropharynx is clear and moist.   Eyes: Pupils are equal, round, and reactive to light. Conjunctivae and EOM are normal. Right eye exhibits no discharge. Left eye exhibits no discharge. Neck: Normal range of motion. Neck supple. Cardiovascular: Normal rate, regular rhythm and normal heart sounds. No murmur heard. Pulmonary/Chest: Effort normal and breath sounds normal. No respiratory distress. She has no wheezes. She has no rales. Abdominal: Soft. Bowel sounds are normal. She exhibits no distension and no mass. There is no tenderness. There is no rebound and no guarding. Musculoskeletal: Normal range of motion. She exhibits no edema. Neurological: She is alert and oriented to person, place, and time. No cranial nerve deficit. She exhibits normal muscle tone. Skin: Skin is warm and dry. No rash noted. She is not diaphoretic. Nursing note and vitals reviewed. Diagnostic Study Results     Labs -     Recent Results (from the past 12 hour(s))   GLUCOSE, POC    Collection Time: 05/27/19  6:54 AM   Result Value Ref Range    Glucose (POC) 72 65 - 100 mg/dL    Performed by [facility]    CBC WITH AUTOMATED DIFF    Collection Time: 05/27/19  6:57 AM   Result Value Ref Range    WBC 12.8 (H) 3.6 - 11.0 K/uL    RBC 4.75 3.80 - 5.20 M/uL    HGB 15.1 11.5 - 16.0 g/dL    HCT 45.1 35.0 - 47.0 %    MCV 94.9 80.0 - 99.0 FL    MCH 31.8 26.0 - 34.0 PG    MCHC 33.5 30.0 - 36.5 g/dL    RDW 12.9 11.5 - 14.5 %    PLATELET 811 819 - 997 K/uL    MPV 11.3 8.9 - 12.9 FL    NRBC 0.0 0  WBC    ABSOLUTE NRBC 0.00 0.00 - 0.01 K/uL    NEUTROPHILS 92 (H) 32 - 75 %    LYMPHOCYTES 5 (L) 12 - 49 %    MONOCYTES 3 (L) 5 - 13 %    EOSINOPHILS 0 0 - 7 %    BASOPHILS 0 0 - 1 %    IMMATURE GRANULOCYTES 0 0.0 - 0.5 %    ABS. NEUTROPHILS 11.8 (H) 1.8 - 8.0 K/UL    ABS. LYMPHOCYTES 0.6 (L) 0.8 - 3.5 K/UL    ABS. MONOCYTES 0.4 0.0 - 1.0 K/UL    ABS. EOSINOPHILS 0.0 0.0 - 0.4 K/UL    ABS. BASOPHILS 0.0 0.0 - 0.1 K/UL    ABS. IMM.  GRANS. 0.0 0.00 - 0.04 K/UL    DF AUTOMATED      RBC COMMENTS NORMOCYTIC, NORMOCHROMIC     METABOLIC PANEL, COMPREHENSIVE    Collection Time: 05/27/19  6:57 AM   Result Value Ref Range    Sodium 136 136 - 145 mmol/L    Potassium 3.1 (L) 3.5 - 5.1 mmol/L    Chloride 103 97 - 108 mmol/L    CO2 27 21 - 32 mmol/L    Anion gap 6 5 - 15 mmol/L    Glucose 81 65 - 100 mg/dL    BUN 22 (H) 6 - 20 MG/DL    Creatinine 1.08 (H) 0.55 - 1.02 MG/DL    BUN/Creatinine ratio 20 12 - 20      GFR est AA >60 >60 ml/min/1.73m2    GFR est non-AA 53 (L) >60 ml/min/1.73m2    Calcium 9.2 8.5 - 10.1 MG/DL    Bilirubin, total 4.7 (H) 0.2 - 1.0 MG/DL    ALT (SGPT) >3,500 (H) 12 - 78 U/L    AST (SGOT) >2,000 (H) 15 - 37 U/L    Alk. phosphatase 199 (H) 45 - 117 U/L    Protein, total 8.5 (H) 6.4 - 8.2 g/dL    Albumin 4.3 3.5 - 5.0 g/dL    Globulin 4.2 (H) 2.0 - 4.0 g/dL    A-G Ratio 1.0 (L) 1.1 - 2.2     LIPASE    Collection Time: 05/27/19  6:57 AM   Result Value Ref Range    Lipase 153 73 - 393 U/L   URINALYSIS W/ RFLX MICROSCOPIC    Collection Time: 05/27/19  9:57 AM   Result Value Ref Range    Color DARK YELLOW      Appearance TURBID (A) CLEAR      Specific gravity 1.030 1.003 - 1.030      pH (UA) 6.0 5.0 - 8.0      Protein 300 (A) NEG mg/dL    Glucose NEGATIVE  NEG mg/dL    Ketone 15 (A) NEG mg/dL    Blood LARGE (A) NEG      Urobilinogen 1.0 0.2 - 1.0 EU/dL    Nitrites NEGATIVE  NEG      Leukocyte Esterase SMALL (A) NEG      WBC 0-4 0 - 4 /hpf    RBC 0-5 0 - 5 /hpf    Epithelial cells FEW FEW /lpf    Bacteria 1+ (A) NEG /hpf    Amorphous Crystals 1+ (A) NEG    Hyaline cast 0-2 0 - 5 /lpf   BILIRUBIN, CONFIRM    Collection Time: 05/27/19  9:57 AM   Result Value Ref Range    Bilirubin UA, confirm NEGATIVE  NEG     PROTHROMBIN TIME + INR    Collection Time: 05/27/19 12:42 PM   Result Value Ref Range    INR 2.4 (H) 0.9 - 1.1      Prothrombin time 23.5 (H) 9.0 - 11.1 sec   ACETAMINOPHEN    Collection Time: 05/27/19 12:42 PM   Result Value Ref Range    Acetaminophen level <2 (L) 10 - 30 ug/mL   SALICYLATE    Collection Time: 05/27/19 12:42 PM   Result Value Ref Range    Salicylate level <0.6 (L) 2.8 - 20.0 MG/DL       Radiologic Studies -   CT ABD PELV W CONT   Final Result      1. No acute abnormality      US ABD LTD   Final Result   Normal ultrasound examination of the right upper quadrant. CT Results  (Last 48 hours)               05/27/19 1140  CT ABD PELV W CONT Final result    Impression:      1.  No acute abnormality       Narrative:  EXAM: CT ABD PELV W CONT       INDICATION: elevated LFT with vomiting       COMPARISON: 5/27/2019        CONTRAST: 100 mL of Isovue-370. TECHNIQUE:    Following the uneventful intravenous administration of contrast, thin axial   images were obtained through the abdomen and pelvis. Coronal and sagittal   reconstructions were generated. Oral contrast was not administered. CT dose   reduction was achieved through use of a standardized protocol tailored for this   examination and automatic exposure control for dose modulation. FINDINGS:    LUNG BASES: Clear. INCIDENTALLY IMAGED HEART AND MEDIASTINUM: Unremarkable. LIVER: Tiny hypodensity right hepatic lobe too small to characterize   GALLBLADDER: Unremarkable. SPLEEN: No mass. PANCREAS: No mass or ductal dilatation. ADRENALS: Unremarkable. KIDNEYS: No mass, calculus, or hydronephrosis. STOMACH: Unremarkable. SMALL BOWEL: No dilatation or wall thickening. COLON: No dilatation or wall thickening. APPENDIX: Unremarkable. PERITONEUM: No ascites or pneumoperitoneum. RETROPERITONEUM: No lymphadenopathy or aortic aneurysm. Minimal atherosclerotic   vascular change   REPRODUCTIVE ORGANS: Post hysterectomy   URINARY BLADDER: No mass or calculus. BONES: No destructive bone lesion. ADDITIONAL COMMENTS: N/A               CXR Results  (Last 48 hours)    None            Medical Decision Making   I am the first provider for this patient. I reviewed the vital signs, available nursing notes, past medical history, past surgical history, family history and social history. Vital Signs-Reviewed the patient's vital signs.   Patient Vitals for the past 12 hrs:   Temp Pulse Resp BP SpO2   05/27/19 1300  82 15 146/85 97 %   05/27/19 0845  81 15 135/61 99 %   05/27/19 0830  67 16 126/57 99 %   05/27/19 0815  82 12 135/68 99 %   05/27/19 0800  83 18 127/64 98 %   05/27/19 0745  60 14 130/59 98 %   05/27/19 0730  62 15 122/60 99 %   05/27/19 0715  62 21 116/52 99 %   05/27/19 0700  78 28 132/67 99 % 05/27/19 0629 97.6 °F (36.4 °C) (!) 101 18 124/68 100 %       Pulse Oximetry Analysis - 100% on RA    Cardiac Monitor:   Rate: 88bpm  Rhythm: Normal Sinus Rhythm      Records Reviewed: Nursing Notes, Old Medical Records, Previous Radiology Studies and Previous Laboratory Studies    Provider Notes (Medical Decision Making):   MDM: Middle aged female with isolated nausea and vomiting with a benign exam; symptom management initiated with observation. ED Course:   Initial assessment performed. The patients presenting problems have been discussed, and they are in agreement with the care plan formulated and outlined with them. I have encouraged them to ask questions as they arise throughout their visit. 6:50 AM  Spent 3-5 minutes discussing the risks of smoking and the benefits of smoking cessation as well as the long term sequelae of smoking with the pt who verbalized her understanding. Reviewed strategies for success, including gradually decreasing the number of cigarettes smoked a day. PROGRESS NOTE:  10:30  Pt no longer nauseated and requesting food. Given elevated transaminases, pt to remain NPO; CT abd and ultrasound ordered for further diagnostics. CONSULT NOTE:   12:15 PM  Kaylah Oliva MD spoke with Dr Gabino Hernandez,   Specialty: Hospitalist  Discussed pt's hx, disposition, and available diagnostic and imaging results. Reviewed care plans. Consultant agrees with plans as outlined. She recommends discussion with GI to determine pts stability/ability to stay at [facility]. CONSULT NOTE:   12:50 PM  Kaylah Oliva MD spoke with Dr Meli Prajapati,   Specialty: GI  Discussed pt's hx, disposition, and available diagnostic and imaging results. Reviewed care plans. Consultant agrees with plans as outlined. Recommends waiting for INR. If elevated, he recommends transfer for hepatology. 1pm   INR notably elevated and pt updated regarding results.  Call placed to [facility] transfer center    CONSULT NOTE:   1:38 PM  Kalyn Miller MD spoke with Dr Breanna Hobbs,   Specialty: [facility]  Discussed pt's hx, disposition, and available diagnostic and imaging results. Reviewed care plans. Consultant agrees with plans as outlined. She accepts pt to [facility] but will call back if there is bed availability. No further recommendations received. Critical Care Time:   CRITICAL CARE NOTE :  15:30  IMPENDING DETERIORATION -Airway, Respiratory, Cardiovascular, Metabolic, Renal and Hepatic  ASSOCIATED RISK FACTORS - Hypotension, Shock, Hypoxia, Bleeding, Dysrhythmia, Metabolic changes, Dehydration and CNS Decompensation  MANAGEMENT- Bedside Assessment, Supervision of Care and Transfer  INTERPRETATION -  ECG and Blood Pressure  INTERVENTIONS - hemodynamic mngmt and Metobolic interventions  CASE REVIEW - Hospitalist, Medical Sub-Specialist, Nursing and Family  TREATMENT RESPONSE -Improved and Stable  PERFORMED BY - Self    NOTES   :  I have spent 75 minutes of critical care time involved in lab review, consultations with specialist, family decision- making, bedside attention and documentation. During this entire length of time I was immediately available to the patient. Gómez Woodson. MD Gopal      Diagnosis     Clinical Impression:   1. Transaminitis    2. Nausea and vomiting, intractability of vomiting not specified, unspecified vomiting type    3. Elevated INR        PLAN:  1. Transfer to [facility] for further diagnostics and treatment      Please note, this dictation was completed with [employer], the computer voice recognition software. Quite often unanticipated grammatical, syntax, homophones, and other interpretive errors are inadvertently transcribed by the computer software. Please disregard these errors. Please excuse any errors that have escaped final proof reading.

## 2019-05-27 NOTE — ED NOTES
Bedside and Verbal shift change report given to Amparo Herrera RN  (oncoming nurse) by Norah Washington RN  (offgoing nurse). Report included the following information SBAR, Kardex, ED Summary, Intake/Output, MAR, Recent Results and Med Rec Status.

## 2019-05-27 NOTE — PROGRESS NOTES
Pharmacy Clarification of Prior to Admission Medication Regimen     The patient was interviewed regarding clarification of the prior to admission medication regimen and was questioned regarding use of any other inhalers, topical products, over the counter medications, herbal medications, vitamin products or ophthalmic/nasal/otic medication use. Information Obtained From: Patient    Pertinent Pharmacy Findings:  Patient stated she is not currently prescribed any medications    PTA medication list was corrected to the following:     Prior to Admission Medications   Prescriptions Last Dose Informant Patient Reported? Taking?   acetaminophen (TYLENOL) 500 mg tablet 5/25/2019 at Unknown time Self Yes Yes   Sig: Take 1,000-2,000 mg by mouth every six (6) hours as needed for Pain. diphenhydrAMINE-acetaminophen (TYLENOL PM EXTRA STRENGTH)  mg tab 5/25/2019 at Unknown time Self Yes Yes   Sig: Take 2 Tabs by mouth nightly as needed (sleep).       Facility-Administered Medications: None          Thank you,  Shanna Watters CPhT  Medication History Pharmacy Technician

## 2019-05-27 NOTE — ED NOTES
Pt presents to ED with . Pt reports n/v X3 days. Pt states that she has not had a bowel movement for several days. Pt reports 4/10 pain.  Pt is monitored X 3

## 2019-05-27 NOTE — DISCHARGE INSTRUCTIONS
Patient Education        Liver Function Tests: About These Tests  What is it? Liver function tests check how well your liver is working. Some tests measure the amount of certain enzymes in your blood to check whether the liver is damaged or inflamed. Other tests measure how well the liver can make important proteins or clear waste products from the body. Your doctor will use the test results to help look for certain conditions, such as hepatitis, cirrhosis, or gallbladder problems. Test results that are not normal do not always mean there is a problem with your liver. Your doctor can determine if there is a problem based on your results. The tests may include:  · Alkaline phosphatase (ALP). This test measures the amount of the enzyme ALP in your blood. · Total protein. A total serum protein test measures the amounts of two major groups of proteins in your blood (albumin and globulin) and the total amount of protein. · Bilirubin. This test measures the amount of bilirubin in your blood. When bilirubin levels are high, the skin and whites of the eyes may appear yellow (jaundice). This may be caused by liver disease. · Aspartate aminotransferase (AST) and alanine aminotransferase (ALT). These tests measure the amount of the enzymes AST and ALT in your blood. (Aminotransferase is also known as a transaminase. High levels may be called transaminitis.)  Why is this test done? Liver function tests check how well your liver is working. Some tests help show whether your liver is damaged or inflamed. Your doctor may order liver function tests if you have symptoms of liver disease. These tests also may be done to see how well a treatment for liver disease is working. How can you prepare for the test?  In general, you do not need to prepare before having these tests. Your doctor may give you some specific instructions to prepare.   What happens during the test?  A health professional takes a sample of your blood.  What happens after the test?  You will probably be able to go home right away. When should you call for help? Watch closely for changes in your health, and be sure to contact your doctor if you have any problems. Follow-up care is a key part of your treatment and safety. Be sure to make and go to all appointments, and call your doctor if you are having problems. It's also a good idea to keep a list of the medicines you take. Ask your doctor when you can expect to have your test results. Where can you learn more? Go to http://cecelia-miah.info/. Enter V597 in the search box to learn more about \"Liver Function Tests: About These Tests. \"  Current as of: June 25, 2018  Content Version: 11.9  © 7002-0574 [employer]. Care instructions adapted under license by [employer] (which disclaims liability or warranty for this information). If you have questions about a medical condition or this instruction, always ask your healthcare professional. [address] any warranty or liability for your use of this information.

## 2019-05-27 NOTE — ED NOTES
TRANSFER - OUT REPORT:    Verbal report given to Leela Ortiz RN (name) on Reji Hoang  being transferred to [facility](unit) for routine progression of care       Report consisted of patients Situation, Background, Assessment and   Recommendations(SBAR). Information from the following report(s) SBAR, Kardex, ED Summary, Intake/Output, MAR, Recent Results and Med Rec Status was reviewed with the receiving nurse. Lines:   Peripheral IV 05/27/19 Antecubital (Active)   Site Assessment Clean, dry, & intact 5/27/2019  7:48 AM   Phlebitis Assessment 0 5/27/2019  7:48 AM   Infiltration Assessment 0 5/27/2019  7:48 AM   Dressing Status Clean, dry, & intact 5/27/2019  7:48 AM        Opportunity for questions and clarification was provided.       Patient transported with:   [email or web address]

## 2019-08-05 ENCOUNTER — OFFICE VISIT (OUTPATIENT)
Dept: INTERNAL MEDICINE CLINIC | Age: 54
End: 2019-08-05

## 2019-08-05 VITALS
TEMPERATURE: 98.2 F | HEART RATE: 65 BPM | OXYGEN SATURATION: 97 % | BODY MASS INDEX: 37.13 KG/M2 | RESPIRATION RATE: 18 BRPM | DIASTOLIC BLOOD PRESSURE: 72 MMHG | HEIGHT: 68 IN | SYSTOLIC BLOOD PRESSURE: 124 MMHG | WEIGHT: 245 LBS

## 2019-08-05 DIAGNOSIS — H57.89 REDNESS, EYE: Primary | ICD-10-CM

## 2019-08-05 NOTE — PROGRESS NOTES
1. Have you been to the ER, urgent care clinic since your last visit? Hospitalized since your last visit? ED [facility] 5/27/19 for vomitting  2. Have you seen or consulted any other health care providers outside of the [address] [place] [address] since your last visit? Include any pap smears or colon screening.  No

## 2019-08-05 NOTE — PROGRESS NOTES
47 yo female with OS redness w/o drainage, itching or pain for 2 weeks. No hx injury. Vision is good, but several times, she has felt like a film is over the eye. No fever or feeling ill. PE: Overweight BF is alert   BP = 124/72   T - 98.2   OS - conjunctival redness medially; no redness of eyelid or yvette-orbital tissue    Imp: Red OS    Plan: Refer to Ophthalmology - she is encouraged to seek this consultation w/o delay  ___________________________  Expected course of current diagnosed problem(s) as well as expected progression and possible complications, and desired follow up with provider are discussed with patient. Patient is encouraged to be back in touch with any questions or concerns. Patient expresses understanding of plan of care. Patient is given AVS which includes diagnoses, current medications, vitals.

## 2019-12-05 ENCOUNTER — HOSPITAL ENCOUNTER (OUTPATIENT)
Dept: MAMMOGRAPHY | Age: 54
Discharge: HOME | End: 2019-12-05
Attending: PHYSICIAN ASSISTANT
Payer: COMMERCIAL

## 2019-12-05 DIAGNOSIS — Z12.31 VISIT FOR SCREENING MAMMOGRAM: ICD-10-CM

## 2019-12-05 PROCEDURE — 77063 BREAST TOMOSYNTHESIS BI: CPT

## 2020-07-14 ENCOUNTER — EMPLOYEE WELLNESS (OUTPATIENT)
Dept: OTHER | Facility: CLINIC | Age: 55
End: 2020-07-14

## 2020-07-14 LAB
CHOLEST SERPL-MCNC: 161 MG/DL
GLUCOSE SERPL-MCNC: 111 MG/DL (ref 65–100)
HDLC SERPL-MCNC: 54 MG/DL
LDLC SERPL CALC-MCNC: 95 MG/DL (ref 0–100)
TRIGL SERPL-MCNC: 60 MG/DL (ref ?–150)

## 2020-08-13 ENCOUNTER — OFFICE VISIT (OUTPATIENT)
Dept: INTERNAL MEDICINE CLINIC | Age: 55
End: 2020-08-13
Payer: COMMERCIAL

## 2020-08-13 VITALS
HEIGHT: 68 IN | TEMPERATURE: 98.7 F | BODY MASS INDEX: 40.77 KG/M2 | RESPIRATION RATE: 18 BRPM | HEART RATE: 67 BPM | DIASTOLIC BLOOD PRESSURE: 88 MMHG | OXYGEN SATURATION: 99 % | SYSTOLIC BLOOD PRESSURE: 138 MMHG | WEIGHT: 269 LBS

## 2020-08-13 DIAGNOSIS — Z00.00 ENCOUNTER FOR MEDICAL EXAMINATION TO ESTABLISH CARE: Primary | ICD-10-CM

## 2020-08-13 DIAGNOSIS — R03.0 ELEVATED BP WITHOUT DIAGNOSIS OF HYPERTENSION: ICD-10-CM

## 2020-08-13 DIAGNOSIS — E66.01 OBESITY, MORBID (HCC): ICD-10-CM

## 2020-08-13 PROCEDURE — 99396 PREV VISIT EST AGE 40-64: CPT | Performed by: INTERNAL MEDICINE

## 2020-08-13 NOTE — PROGRESS NOTES
HISTORY OF PRESENT ILLNESS  Princess Meza is a 50 y.o. female. Patient with history of elevated BP, calf wound, obesity. Comes in today to establish care. Has not seen a PCP in a while. Reports that last year she has a calf wound that caused her to see wound care for a while due to the location. Healed now   Reports that she does not have any concerns today. Just completed her Be Well screening for work and was told she had a 111 sugar and  BP of in the 160's 3 times. Reports no concerns about this in the past.   Obesity: reports that she has gained weight. Lost her mother and admits to eating unhealthy often. Was noted to have ankle swelling in the past. Vinh Dunlap worked her up and all results were negative. Currently on no medications   Last optometry appointment was over a year   Mikel Fischer the flu shot yearly for work   Denies CP, SOB, fever   Visit Vitals  /88 (BP 1 Location: Left arm, BP Patient Position: Sitting)   Pulse 67   Temp 98.7 °F (37.1 °C)   Resp 18   Ht 5' 8\" (1.727 m)   Wt 269 lb (122 kg)   LMP  (LMP Unknown)   SpO2 99%   BMI 40.90 kg/m²     Past Medical History:   Diagnosis Date    Adverse effect of anesthesia     DELAYED AWAKENING    Cyst of skin 2016    right side of face    Essential hypertension 2016    PT DENIES    Menopause     LMP-    Thyroid disease     parathyroidectomy     Past Surgical History:   Procedure Laterality Date    HX  SECTION      HX HEENT      POLYPS ON VOCAL CORDS    HX OTHER SURGICAL  2016    PARATHYROIDECTOMY    HX PARTIAL HYSTERECTOMY  2008    HX THYROIDECTOMY      KALLI STEREO VAC  BX BREAST LT 1ST LESION W/CLIP AND SPECIMEN Left ?     Benign    US GUIDE ASP BREAST RT CYST W NDL Right 2012    Benign     Family History   Problem Relation Age of Onset    Hypertension Mother     Heart Disease Mother         MI    Diabetes Father     Breast Cancer Maternal Aunt         Age Unknown    Kidney Disease Maternal Aunt     Anesth Problems Neg Hx      Social History     Socioeconomic History    Marital status:      Spouse name: Not on file    Number of children: Not on file    Years of education: Not on file    Highest education level: Not on file   Occupational History    Not on file   Social Needs    Financial resource strain: Not on file    Food insecurity     Worry: Not on file     Inability: Not on file    Transportation needs     Medical: Not on file     Non-medical: Not on file   Tobacco Use    Smoking status: Current Every Day Smoker     Packs/day: 0.25     Years: 1.00     Pack years: 0.25     Types: Cigarettes     Last attempt to quit: 2019     Years since quittin.1    Smokeless tobacco: Never Used   Substance and Sexual Activity    Alcohol use: Not Currently     Frequency: Never     Comment: OCCASIONAL    Drug use: No    Sexual activity: Yes     Partners: Male     Birth control/protection: None   Lifestyle    Physical activity     Days per week: Not on file     Minutes per session: Not on file    Stress: Not on file   Relationships    Social connections     Talks on phone: Not on file     Gets together: Not on file     Attends [employer]ism service: Not on file     Active member of club or organization: Not on file     Attends meetings of clubs or organizations: Not on file     Relationship status: Not on file    Intimate partner violence     Fear of current or ex partner: Not on file     Emotionally abused: Not on file     Physically abused: Not on file     Forced sexual activity: Not on file   Other Topics Concern     Service Not Asked    Blood Transfusions Not Asked    Caffeine Concern Not Asked    Occupational Exposure Not Asked   Rosalene Chaim Hazards Not Asked    Sleep Concern Not Asked    Stress Concern Not Asked    Weight Concern Not Asked    Special Diet Not Asked    Back Care Not Asked    Exercise Not Asked    Bike Helmet Not Asked    Seat Belt Not Asked    Self-Exams Not Asked Social History Narrative    Not on file     No outpatient encounter medications on file as of 8/13/2020. No facility-administered encounter medications on file as of 8/13/2020. HPI    Review of Systems   Constitutional: Negative for chills and fever. HENT: Negative. Eyes: Negative. Respiratory: Negative. Cardiovascular: Negative. Gastrointestinal: Negative. Genitourinary: Negative. Musculoskeletal: Positive for joint pain. Negative for falls. Neurological: Negative. Psychiatric/Behavioral: Negative. Physical Exam  Vitals signs and nursing note reviewed. Constitutional:       Appearance: She is obese. HENT:      Head: Normocephalic and atraumatic. Eyes:      Pupils: Pupils are equal, round, and reactive to light. Neck:      Musculoskeletal: Neck supple. Cardiovascular:      Rate and Rhythm: Normal rate and regular rhythm. Pulmonary:      Effort: Pulmonary effort is normal.      Breath sounds: Normal breath sounds. No wheezing. Abdominal:      General: Bowel sounds are normal. There is no distension. Palpations: Abdomen is soft. Musculoskeletal:      Comments: Minimal ankle swelling bilaterally, no pitting edema    Skin:     General: Skin is warm. Neurological:      Mental Status: She is alert and oriented to person, place, and time. Psychiatric:         Mood and Affect: Mood normal.         ASSESSMENT and PLAN  Diagnoses and all orders for this visit:    1. Encounter for medical examination to establish care  -     METABOLIC PANEL, COMPREHENSIVE  -     CBC WITH AUTOMATED DIFF  -     HEMOGLOBIN A1C WITH EAG  -     TSH 3RD GENERATION    2. Obesity, morbid ([address]    3. Elevated BP without diagnosis of hypertension      Follow-up and Dispositions    · Return in about 2 weeks (around 8/27/2020), or if symptoms worsen or fail to improve.        current treatment plan is effective, no change in therapy  lab results and schedule of future lab studies reviewed with patient  reviewed diet, exercise and weight control  cardiovascular risk and specific lipid/LDL goals reviewed  reviewed medications and side effects in detail

## 2020-08-14 LAB
ALBUMIN SERPL-MCNC: 4.2 G/DL (ref 3.8–4.9)
ALBUMIN/GLOB SERPL: 1.4 {RATIO} (ref 1.2–2.2)
ALP SERPL-CCNC: 91 IU/L (ref 39–117)
ALT SERPL-CCNC: 12 IU/L (ref 0–32)
AST SERPL-CCNC: 15 IU/L (ref 0–40)
BASOPHILS # BLD AUTO: 0 X10E3/UL (ref 0–0.2)
BASOPHILS NFR BLD AUTO: 0 %
BILIRUB SERPL-MCNC: <0.2 MG/DL (ref 0–1.2)
BUN SERPL-MCNC: 19 MG/DL (ref 6–24)
BUN/CREAT SERPL: 29 (ref 9–23)
CALCIUM SERPL-MCNC: 9.2 MG/DL (ref 8.7–10.2)
CHLORIDE SERPL-SCNC: 103 MMOL/L (ref 96–106)
CO2 SERPL-SCNC: 26 MMOL/L (ref 20–29)
CREAT SERPL-MCNC: 0.65 MG/DL (ref 0.57–1)
EOSINOPHIL # BLD AUTO: 0.2 X10E3/UL (ref 0–0.4)
EOSINOPHIL NFR BLD AUTO: 2 %
ERYTHROCYTE [DISTWIDTH] IN BLOOD BY AUTOMATED COUNT: 11.7 % (ref 11.7–15.4)
EST. AVERAGE GLUCOSE BLD GHB EST-MCNC: 105 MG/DL
GLOBULIN SER CALC-MCNC: 3 G/DL (ref 1.5–4.5)
GLUCOSE SERPL-MCNC: 75 MG/DL (ref 65–99)
HBA1C MFR BLD: 5.3 % (ref 4.8–5.6)
HCT VFR BLD AUTO: 37.9 % (ref 34–46.6)
HGB BLD-MCNC: 11.9 G/DL (ref 11.1–15.9)
IMM GRANULOCYTES # BLD AUTO: 0 X10E3/UL (ref 0–0.1)
IMM GRANULOCYTES NFR BLD AUTO: 0 %
LYMPHOCYTES # BLD AUTO: 2.3 X10E3/UL (ref 0.7–3.1)
LYMPHOCYTES NFR BLD AUTO: 26 %
MCH RBC QN AUTO: 31.4 PG (ref 26.6–33)
MCHC RBC AUTO-ENTMCNC: 31.4 G/DL (ref 31.5–35.7)
MCV RBC AUTO: 100 FL (ref 79–97)
MONOCYTES # BLD AUTO: 0.7 X10E3/UL (ref 0.1–0.9)
MONOCYTES NFR BLD AUTO: 8 %
NEUTROPHILS # BLD AUTO: 5.6 X10E3/UL (ref 1.4–7)
NEUTROPHILS NFR BLD AUTO: 64 %
PLATELET # BLD AUTO: 214 X10E3/UL (ref 150–450)
POTASSIUM SERPL-SCNC: 3.9 MMOL/L (ref 3.5–5.2)
PROT SERPL-MCNC: 7.2 G/DL (ref 6–8.5)
RBC # BLD AUTO: 3.79 X10E6/UL (ref 3.77–5.28)
SODIUM SERPL-SCNC: 142 MMOL/L (ref 134–144)
TSH SERPL DL<=0.005 MIU/L-ACNC: 2.66 UIU/ML (ref 0.45–4.5)
WBC # BLD AUTO: 8.8 X10E3/UL (ref 3.4–10.8)

## 2020-09-28 DIAGNOSIS — I10 ESSENTIAL HYPERTENSION: ICD-10-CM

## 2020-09-28 RX ORDER — SPIRONOLACTONE AND HYDROCHLOROTHIAZIDE 25; 25 MG/1; MG/1
1 TABLET ORAL DAILY
Qty: 90 TAB | Refills: 0 | Status: SHIPPED | OUTPATIENT
Start: 2020-09-28 | End: 2021-03-17 | Stop reason: SDUPTHER

## 2021-03-17 ENCOUNTER — OFFICE VISIT (OUTPATIENT)
Dept: INTERNAL MEDICINE CLINIC | Age: 56
End: 2021-03-17
Payer: COMMERCIAL

## 2021-03-17 VITALS
BODY MASS INDEX: 37.74 KG/M2 | HEIGHT: 68 IN | SYSTOLIC BLOOD PRESSURE: 132 MMHG | WEIGHT: 249 LBS | TEMPERATURE: 96 F | HEART RATE: 90 BPM | RESPIRATION RATE: 17 BRPM | OXYGEN SATURATION: 96 % | DIASTOLIC BLOOD PRESSURE: 80 MMHG

## 2021-03-17 DIAGNOSIS — I10 ESSENTIAL HYPERTENSION: Primary | ICD-10-CM

## 2021-03-17 DIAGNOSIS — E66.01 OBESITY, MORBID (HCC): ICD-10-CM

## 2021-03-17 PROCEDURE — 99214 OFFICE O/P EST MOD 30 MIN: CPT | Performed by: INTERNAL MEDICINE

## 2021-03-17 RX ORDER — SPIRONOLACTONE AND HYDROCHLOROTHIAZIDE 25; 25 MG/1; MG/1
1 TABLET ORAL DAILY
Qty: 90 TAB | Refills: 0 | Status: SHIPPED | OUTPATIENT
Start: 2021-03-17 | End: 2021-06-23 | Stop reason: SDUPTHER

## 2021-03-17 NOTE — PROGRESS NOTES
HISTORY OF PRESENT ILLNESS  Johnson Little is a 50 y.o. female. Patient with a history of HTN, and obesity. Comes in for a follow up. Since the last visit has dropped 20 lbs. States that she cuts herself off at a certain time during the day. Did get both vaccinations for CVOID and the flu shot. BP is stable. Is taking BP medications as directed. Limiting salt intake   Is going to schedule her mammogram   Denies any CP, SOB, fever. No ankle swelling   Visit Vitals  /80 (BP 1 Location: Right upper arm, BP Patient Position: Sitting, BP Cuff Size: Adult)   Pulse 90   Temp (!) 96 °F (35.6 °C) (Oral)   Resp 17   Ht 5' 8\" (1.727 m)   Wt 249 lb (112.9 kg)   LMP  (LMP Unknown)   SpO2 96%   BMI 37.86 kg/m²     Past Medical History:   Diagnosis Date    Adverse effect of anesthesia     DELAYED AWAKENING    Cyst of skin     right side of face    Essential hypertension 2016    PT DENIES    Menopause     LMP-    Thyroid disease     parathyroidectomy     Past Surgical History:   Procedure Laterality Date    HX  SECTION      HX HEENT      POLYPS ON VOCAL CORDS    HX OTHER SURGICAL  2016    PARATHYROIDECTOMY    HX PARTIAL HYSTERECTOMY  2008    HX THYROIDECTOMY      KALLI STEREO VAC  BX BREAST LT 1ST LESION W/CLIP AND SPECIMEN Left ? Benign    US GUIDE ASP BREAST RT CYST W NDL Right 2012    Benign     Family History   Problem Relation Age of Onset    Hypertension Mother     Heart Disease Mother         MI    Diabetes Father     Breast Cancer Maternal Aunt         Age Unknown    Kidney Disease Maternal Aunt     Anesth Problems Neg Hx      Outpatient Encounter Medications as of 3/17/2021   Medication Sig Dispense Refill    spironolactone-hydrochlorothiazide (ALDACTAZIDE) 25-25 mg per tablet Take 1 Tab by mouth daily. 90 Tab 0    [DISCONTINUED] spironolactone-hydrochlorothiazide (ALDACTAZIDE) 25-25 mg per tablet Take 1 Tab by mouth daily.  90 Tab 0     No facility-administered encounter medications on file as of 3/17/2021. HPI    Review of Systems   Constitutional: Negative. Respiratory: Negative. Cardiovascular: Negative. Gastrointestinal: Negative. Musculoskeletal: Negative. Neurological: Negative. Psychiatric/Behavioral: Negative. Physical Exam  Vitals signs and nursing note reviewed. Constitutional:       Appearance: She is obese. Cardiovascular:      Rate and Rhythm: Normal rate and regular rhythm. Pulmonary:      Effort: Pulmonary effort is normal.      Breath sounds: Normal breath sounds. Abdominal:      General: Bowel sounds are normal.   Musculoskeletal:      Right lower leg: No edema. Left lower leg: No edema. Skin:     General: Skin is warm. Neurological:      Mental Status: She is alert and oriented to person, place, and time. Psychiatric:         Behavior: Behavior normal.         ASSESSMENT and PLAN  Diagnoses and all orders for this visit:    1. Essential hypertension  -     spironolactone-hydrochlorothiazide (ALDACTAZIDE) 25-25 mg per tablet; Take 1 Tab by mouth daily.     2. Obesity, morbid ([address]      Follow-up and Dispositions    · Return in about 6 months (around 9/17/2021), or if symptoms worsen or fail to improve, for Follow up.       lab results and schedule of future lab studies reviewed with patient  reviewed diet, exercise and weight control  cardiovascular risk and specific lipid/LDL goals reviewed  reviewed medications and side effects in detail

## 2021-03-17 NOTE — PROGRESS NOTES
Health Maintenance Due   Topic Date Due    Pneumococcal 0-64 years (1 of 1 - PPSV23) Never done    Shingrix Vaccine Age 50> (1 of 2) Never done    PAP AKA CERVICAL CYTOLOGY  06/01/2018    Flu Vaccine (1) 09/01/2020       Chief Complaint   Patient presents with    Hypertension    Medication Refill       1. Have you been to the ER, urgent care clinic since your last visit? Hospitalized since your last visit? No    2. Have you seen or consulted any other health care providers outside of the [address] [place] [address] since your last visit? Include any pap smears or colon screening. No    3) Do you have an Advance Directive on file? no    4) Are you interested in receiving information on Advance Directives? NO      Patient is accompanied by self I have received verbal consent from Bella Castaneda to discuss any/all medical information while they are present in the room.

## 2021-03-18 ENCOUNTER — TRANSCRIBE ORDER (OUTPATIENT)
Dept: SCHEDULING | Age: 56
End: 2021-03-18

## 2021-03-18 DIAGNOSIS — Z12.31 VISIT FOR SCREENING MAMMOGRAM: Primary | ICD-10-CM

## 2021-03-25 NOTE — MR AVS SNAPSHOT
Visit Information Date & Time Provider Department Dept. Phone Encounter #  
 10/30/2017  9:00 AM [place] [id number] [facility] [phone] Follow-up Instructions Return in about 6 months (around 4/30/2018) for F/U weight. Upcoming Health Maintenance Date Due COLONOSCOPY 7/30/1985 Pneumococcal 19-64 Medium Risk (1 of 1 - PPSV23) 7/30/1986 PAP AKA CERVICAL CYTOLOGY 6/1/2018 BREAST CANCER SCRN MAMMOGRAM 9/12/2019 DTaP/Tdap/Td series (2 - Td) 10/3/2026 Allergies as of 10/30/2017  Review Complete On: 10/30/2017 By: Alla Rider MD  
  
 Severity Noted Reaction Type Reactions Latex  02/27/2015    Itching Current Immunizations  Reviewed on 2/27/2015 Name Date Influenza Nasal Vaccine 10/5/2017, 11/15/2015, 11/1/2014 Tdap 10/3/2016 Not reviewed this visit You Were Diagnosed With   
  
 Codes Comments Onychodystrophy    -  Primary ICD-10-CM: L60.3 ICD-9-CM: 703.8 Vitals BP Pulse Temp Resp Height(growth percentile) Weight(growth percentile) 140/80 62 97.5 °F (36.4 °C) (Oral) 16 5' 8\" (1.727 m) 248 lb 1.6 oz (112.5 kg) SpO2 BMI OB Status Smoking Status 97% 37.72 kg/m2 Hysterectomy Current Every Day Smoker Vitals History BMI and BSA Data Body Mass Index Body Surface Area  
 37.72 kg/m 2 2.32 m 2 Preferred Pharmacy Pharmacy Name Phone [address] [phone] Your Updated Medication List  
  
   
This list is accurate as of: 10/30/17  9:25 AM.  Always use your most recent med list.  
  
  
  
  
 nystatin topical cream  
Commonly known as:  MYCOSTATIN Apply  to affected area two (2) times a day. triamcinolone acetonide 0.1 % topical cream  
Commonly known as:  KENALOG Apply  to affected area two (2) times a day. use thin layer We Performed the Following REFERRAL TO PODIATRY [REF90 Custom] Comments:  
 Please evaluate patient for nail disease Follow-up Instructions Return in about 6 months (around 4/30/2018) for F/U weight. Referral Information Referral ID Referred By Referred To  
  
 [id number], [address] [facility] [facility]   
   [address] [facility], [address] Visits Status Start Date End Date 1 New Request 10/30/17 10/30/18 If your referral has a status of pending review or denied, additional information will be sent to support the outcome of this decision. Patient Instructions Follow a calorie controlled diet. Get regular aerobic exercise. Try to lose 20 pounds over the next 4 months. See Podiatrist for nails. Introducing \[facility] [place] [facility]\"" & HEALTH SERVICES! Dear Radha Alejandro: 
Thank you for requesting a [employer] account. Our records indicate that you already have an active [employer] account. You can access your account anytime at [email or web address] Did you know that you can access your hospital and ER discharge instructions at any time in [employer]? You can also review all of your test results from your hospital stay or ER visit. Additional Information If you have questions, please visit the Frequently Asked Questions section of the [employer] website at [email or web address] Remember, [employer] is NOT to be used for urgent needs. For medical emergencies, dial 911. Now available from your iPhone and Android! Please provide this summary of care documentation to your next provider. Your primary care clinician is listed as Delicia Styles. If you have any questions after today's visit, please call [phone]. 30

## 2021-04-09 ENCOUNTER — HOSPITAL ENCOUNTER (OUTPATIENT)
Dept: MAMMOGRAPHY | Age: 56
Discharge: HOME | End: 2021-04-09
Attending: INTERNAL MEDICINE
Payer: COMMERCIAL

## 2021-04-09 DIAGNOSIS — Z12.31 VISIT FOR SCREENING MAMMOGRAM: ICD-10-CM

## 2021-04-09 PROCEDURE — 77063 BREAST TOMOSYNTHESIS BI: CPT

## 2021-04-26 ENCOUNTER — HOSPITAL ENCOUNTER (OUTPATIENT)
Dept: MAMMOGRAPHY | Age: 56
Discharge: HOME | End: 2021-04-26
Attending: INTERNAL MEDICINE
Payer: COMMERCIAL

## 2021-04-26 DIAGNOSIS — R92.8 ABNORMAL MAMMOGRAM: ICD-10-CM

## 2021-04-26 PROCEDURE — 77065 DX MAMMO INCL CAD UNI: CPT

## 2021-04-26 PROCEDURE — 76642 ULTRASOUND BREAST LIMITED: CPT

## 2021-06-22 LAB
CHOLEST SERPL-MCNC: 162 MG/DL
GLUCOSE SERPL-MCNC: 102 MG/DL (ref 65–100)
HDLC SERPL-MCNC: 45 MG/DL
LDLC SERPL CALC-MCNC: 104 MG/DL (ref 0–100)
TRIGL SERPL-MCNC: 65 MG/DL (ref ?–150)

## 2021-06-23 DIAGNOSIS — I10 ESSENTIAL HYPERTENSION: ICD-10-CM

## 2021-06-23 RX ORDER — SPIRONOLACTONE AND HYDROCHLOROTHIAZIDE 25; 25 MG/1; MG/1
1 TABLET ORAL DAILY
Qty: 90 TABLET | Refills: 0 | Status: SHIPPED | OUTPATIENT
Start: 2021-06-23 | End: 2021-09-13 | Stop reason: SDUPTHER

## 2021-09-13 ENCOUNTER — OFFICE VISIT (OUTPATIENT)
Dept: INTERNAL MEDICINE CLINIC | Age: 56
End: 2021-09-13
Payer: COMMERCIAL

## 2021-09-13 VITALS
WEIGHT: 242 LBS | HEIGHT: 68 IN | BODY MASS INDEX: 36.68 KG/M2 | RESPIRATION RATE: 16 BRPM | TEMPERATURE: 98.2 F | SYSTOLIC BLOOD PRESSURE: 128 MMHG | DIASTOLIC BLOOD PRESSURE: 74 MMHG

## 2021-09-13 DIAGNOSIS — E66.01 OBESITY, MORBID (HCC): ICD-10-CM

## 2021-09-13 DIAGNOSIS — Z11.1 SCREENING-PULMONARY TB: ICD-10-CM

## 2021-09-13 DIAGNOSIS — R03.0 ELEVATED BP WITHOUT DIAGNOSIS OF HYPERTENSION: ICD-10-CM

## 2021-09-13 DIAGNOSIS — Z23 NEEDS FLU SHOT: ICD-10-CM

## 2021-09-13 DIAGNOSIS — Z00.00 WELLNESS EXAMINATION: Primary | ICD-10-CM

## 2021-09-13 DIAGNOSIS — I10 ESSENTIAL HYPERTENSION: ICD-10-CM

## 2021-09-13 PROCEDURE — 90686 IIV4 VACC NO PRSV 0.5 ML IM: CPT | Performed by: INTERNAL MEDICINE

## 2021-09-13 PROCEDURE — 99214 OFFICE O/P EST MOD 30 MIN: CPT | Performed by: INTERNAL MEDICINE

## 2021-09-13 RX ORDER — SPIRONOLACTONE AND HYDROCHLOROTHIAZIDE 25; 25 MG/1; MG/1
1 TABLET ORAL DAILY
Qty: 90 TABLET | Refills: 0 | Status: SHIPPED | OUTPATIENT
Start: 2021-09-13 | End: 2022-02-08 | Stop reason: SDUPTHER

## 2021-09-13 NOTE — PATIENT INSTRUCTIONS
Vaccine Information Statement    Influenza (Flu) Vaccine (Inactivated or Recombinant): What You Need to Know    Many vaccine information statements are available in [place] and other languages. See www.immunize.org/vis. Hojas de información sobre vacunas están disponibles en español y en muchos otros idiomas. Visite www.immunize.org/vis. 1. Why get vaccinated? Influenza vaccine can prevent influenza (flu). Flu is a contagious disease that spreads around the [facility] every year, usually between October and May. Anyone can get the flu, but it is more dangerous for some people. Infants and young children, people 72 years and older, pregnant people, and people with certain health conditions or a weakened immune system are at greatest risk of flu complications. Pneumonia, bronchitis, sinus infections, and ear infections are examples of flu-related complications. If you have a medical condition, such as heart disease, cancer, or diabetes, flu can make it worse. Flu can cause fever and chills, sore throat, muscle aches, fatigue, cough, headache, and runny or stuffy nose. Some people may have vomiting and diarrhea, though this is more common in children than adults. In an average year, thousands of people in the [facility] die from flu, and many more are hospitalized. Flu vaccine prevents millions of illnesses and flu-related visits to the doctor each year. 2. Influenza vaccines     CDC recommends everyone 6 months and older get vaccinated every flu season. Children 6 months through 6years of age may need 2 doses during a single flu season. Everyone else needs only 1 dose each flu season. It takes about 2 weeks for protection to develop after vaccination. There are many flu viruses, and they are always changing. Each year a new flu vaccine is made to protect against the influenza viruses believed to be likely to cause disease in the upcoming flu season.  Even when the vaccine doesnt exactly match these viruses, it may still provide some protection. Influenza vaccine does not cause flu. Influenza vaccine may be given at the same time as other vaccines. 3. Talk with your health care provider    Tell your vaccination provider if the person getting the vaccine:   Has had an allergic reaction after a previous dose of influenza vaccine, or has any severe, life-threatening allergies    Has ever had Guillain-Barré Syndrome (also called GBS)    In some cases, your health care provider may decide to postpone influenza vaccination until a future visit. Influenza vaccine can be administered at any time during pregnancy. People who are or will be pregnant during influenza season should receive inactivated influenza vaccine. People with minor illnesses, such as a cold, may be vaccinated. People who are moderately or severely ill should usually wait until they recover before getting influenza vaccine. Your health care provider can give you more information. 4. Risks of a vaccine reaction     Soreness, redness, and swelling where the shot is given, fever, muscle aches, and headache can happen after influenza vaccination.  There may be a very small increased risk of Guillain-Barré Syndrome (GBS) after inactivated influenza vaccine (the flu shot). [place] children who get the flu shot along with pneumococcal vaccine (PCV13) and/or DTaP vaccine at the same time might be slightly more likely to have a seizure caused by fever. Tell your health care provider if a child who is getting flu vaccine has ever had a seizure. People sometimes faint after medical procedures, including vaccination. Tell your provider if you feel dizzy or have vision changes or ringing in the ears. As with any medicine, there is a very remote chance of a vaccine causing a severe allergic reaction, other serious injury, or death. 5. What if there is a serious problem?     An allergic reaction could occur after the vaccinated person leaves the clinic. If you see signs of a severe allergic reaction (hives, swelling of the face and throat, difficulty breathing, a fast heartbeat, dizziness, or weakness), call 9-1-1 and get the person to the nearest hospital.    For other signs that concern you, call your health care provider. Adverse reactions should be reported to the Vaccine Adverse Event Reporting System (VAERS). Your health care provider will usually file this report, or you can do it yourself. Visit the VAERS website at www.vaers. [facility].gov or call 7-727.411.7574. VAERS is only for reporting reactions, and VAERS staff members do not give medical advice. 6. The National Vaccine Injury Compensation Program    The [facility] Vaccine Injury Compensation Program (VICP) is a federal program that was created to compensate people who may have been injured by certain vaccines. Claims regarding alleged injury or death due to vaccination have a time limit for filing, which may be as short as two years. Visit the VICP website at www.[facility].gov/vaccinecompensation or call 9-389.225.9353 to learn about the program and about filing a claim. 7. How can I learn more?  Ask your health care provider.  Call your local or state health department.  Visit the website of the Food and Drug Administration (FDA) for vaccine package inserts and additional information at www.fda.gov/vaccines-blood-biologics/vaccines.  Contact the Centers for Disease Control and Prevention (CDC):  - Call 8-287.536.5174 (1-800-CDC-INFO) or  - Visit CDCs influenza website at www.cdc.gov/flu. Vaccine Information Statement   Inactivated Influenza Vaccine   8/6/2021  42 SHADI Isbell [id number]   Department of Health and Human Services  Centers for Disease Control and Prevention    Office Use Only      Vaccine Information Statement    Influenza (Flu) Vaccine (Inactivated or Recombinant):  What You Need to Know    Many vaccine information statements are available in [place] and other languages. See www.immunize.org/vis. Hojas de información sobre vacunas están disponibles en español y en muchos otros idiomas. Visite www.immunize.org/vis. 1. Why get vaccinated? Influenza vaccine can prevent influenza (flu). Flu is a contagious disease that spreads around the [place] every year, usually between October and May. Anyone can get the flu, but it is more dangerous for some people. Infants and young children, people 72 years and older, pregnant people, and people with certain health conditions or a weakened immune system are at greatest risk of flu complications. Pneumonia, bronchitis, sinus infections, and ear infections are examples of flu-related complications. If you have a medical condition, such as heart disease, cancer, or diabetes, flu can make it worse. Flu can cause fever and chills, sore throat, muscle aches, fatigue, cough, headache, and runny or stuffy nose. Some people may have vomiting and diarrhea, though this is more common in children than adults. In an average year, thousands of people in the [facility] die from flu, and many more are hospitalized. Flu vaccine prevents millions of illnesses and flu-related visits to the doctor each year. 2. Influenza vaccines     CDC recommends everyone 6 months and older get vaccinated every flu season. Children 6 months through 6years of age may need 2 doses during a single flu season. Everyone else needs only 1 dose each flu season. It takes about 2 weeks for protection to develop after vaccination. There are many flu viruses, and they are always changing. Each year a new flu vaccine is made to protect against the influenza viruses believed to be likely to cause disease in the upcoming flu season. Even when the vaccine doesnt exactly match these viruses, it may still provide some protection. Influenza vaccine does not cause flu.     Influenza vaccine may be given at the same time as other vaccines. 3. Talk with your health care provider    Tell your vaccination provider if the person getting the vaccine:   Has had an allergic reaction after a previous dose of influenza vaccine, or has any severe, life-threatening allergies    Has ever had Guillain-Barré Syndrome (also called GBS)    In some cases, your health care provider may decide to postpone influenza vaccination until a future visit. Influenza vaccine can be administered at any time during pregnancy. People who are or will be pregnant during influenza season should receive inactivated influenza vaccine. People with minor illnesses, such as a cold, may be vaccinated. People who are moderately or severely ill should usually wait until they recover before getting influenza vaccine. Your health care provider can give you more information. 4. Risks of a vaccine reaction     Soreness, redness, and swelling where the shot is given, fever, muscle aches, and headache can happen after influenza vaccination.  There may be a very small increased risk of Guillain-Barré Syndrome (GBS) after inactivated influenza vaccine (the flu shot). [employer] children who get the flu shot along with pneumococcal vaccine (PCV13) and/or DTaP vaccine at the same time might be slightly more likely to have a seizure caused by fever. Tell your health care provider if a child who is getting flu vaccine has ever had a seizure. People sometimes faint after medical procedures, including vaccination. Tell your provider if you feel dizzy or have vision changes or ringing in the ears. As with any medicine, there is a very remote chance of a vaccine causing a severe allergic reaction, other serious injury, or death. 5. What if there is a serious problem? An allergic reaction could occur after the vaccinated person leaves the clinic.  If you see signs of a severe allergic reaction (hives, swelling of the face and throat, difficulty breathing, a fast heartbeat, dizziness, or weakness), call 9-1-1 and get the person to the nearest hospital.    For other signs that concern you, call your health care provider. Adverse reactions should be reported to the Vaccine Adverse Event Reporting System (VAERS). Your health care provider will usually file this report, or you can do it yourself. Visit the VAERS website at www.vaers. [facility].gov or call 5-480.568.4876. VAERS is only for reporting reactions, and VAERS staff members do not give medical advice. 6. The National Vaccine Injury Compensation Program    The [facility] Vaccine Injury Compensation Program (VICP) is a federal program that was created to compensate people who may have been injured by certain vaccines. Claims regarding alleged injury or death due to vaccination have a time limit for filing, which may be as short as two years. Visit the VICP website at [facility].gov/vaccinecompensation or call 9-641.577.5869 to learn about the program and about filing a claim. 7. How can I learn more?  Ask your health care provider.  Call your local or state health department.  Visit the website of the Food and Drug Administration (FDA) for vaccine package inserts and additional information at www.fda.gov/vaccines-blood-biologics/vaccines.  Contact the Centers for Disease Control and Prevention (CDC):  - Call 5-547.956.4204 (1-800-CDC-INFO) or  - Visit CDCs influenza website at www.cdc.gov/flu. Vaccine Information Statement   Inactivated Influenza Vaccine   8/6/2021  42 SHADI Garcia [id number]   Department of Health and Human Services  Centers for Disease Control and Prevention    Office Use Only

## 2021-09-13 NOTE — PROGRESS NOTES
ADVISED PATIENT OF THE FOLLOWING HEALTH MAINTAINCE DUE  Health Maintenance Due   Topic Date Due    Pneumococcal 0-64 years (1 of 2 - PPSV23) Never done    Shingrix Vaccine Age 50> (2 of 2) 10/22/2020    Flu Vaccine (1) 09/01/2021      Chief Complaint   Patient presents with    Other     needs TB test       1. Have you been to the ER, urgent care clinic since your last visit? Hospitalized since your last visit? No    2. Have you seen or consulted any other health care providers outside of the [address] [place] [address] since your last visit? Include any DEXA scan, mammography  or colon screening. No    3. Do you have an Advance Directive on file? no    4. Do you have a DNR on file? no    Patient is accompanied by self I have received verbal consent from Leann Bar to discuss any/all medical information while they are present in the room.       Advance Care Planning 1/29/2019   Patient's Healthcare Decision Maker is: Legal Next of Kin   Primary Decision Maker Name -   Primary Decision Maker Phone Number -   Primary Decision Maker Relationship to Patient -   Confirm Advance Directive None   Patient Would Like to Complete Advance Directive No   Does the patient have other document types -         [facility], [address] [place] [address]  Phone: [phone] Fax: [phone]    [facility] [address], [place] - [address] [facility]  [address] [address]  Phone: [phone] Fax: [phone]

## 2021-09-13 NOTE — PROGRESS NOTES
Allison Jimenes is a 49 y.o. female  who presents for routine immunization(s). Patient denies any symptoms , reactions or allergies that would exclude them from being immunized today. Risks and adverse reactions were discussed. The patient/caregiver was provided the VIS and allotted time to read and ask questions prior to administration of vaccine. Patient voiced full understanding and signed Adult Immunization Consent form. All questions were addressed. Patient was observed for 10 min post injection. There were no reactions observed.

## 2021-09-13 NOTE — PROGRESS NOTES
HISTORY OF PRESENT ILLNESS  Maryann Fontana is a 49 y.o. female. Patient was seen for a follow up. Reports that she will be changing jobs soon. Will need a TB test completed   Will need routine labs done. Last were done  A year ago. Reports that she is taking her BP at home and watching her salt intake. Has lost almost 20 lbs! Reports that she has made several changes and is feeling good. Will need the flu vaccine. Visit Vitals  /74 (BP 1 Location: Right arm, BP Patient Position: Sitting, BP Cuff Size: Adult)   Temp 98.2 °F (36.8 °C) (Oral)   Resp 16   Ht 5' 8\" (1.727 m)   Wt 242 lb (109.8 kg)   LMP  (LMP Unknown)   BMI 36.80 kg/m²     Past Medical History:   Diagnosis Date    Adverse effect of anesthesia     DELAYED AWAKENING    Cyst of skin     right side of face    Essential hypertension 2016    PT DENIES    Menopause     LMP-    Thyroid disease     parathyroidectomy     Past Surgical History:   Procedure Laterality Date    HX  SECTION      HX HEENT      POLYPS ON VOCAL CORDS    HX OTHER SURGICAL  2016    PARATHYROIDECTOMY    HX PARTIAL HYSTERECTOMY      HX THYROIDECTOMY      KALLI STEREO VAC  BX BREAST LT 1ST LESION W/CLIP AND SPECIMEN Left ? Benign    US GUIDE ASP BREAST RT CYST W NDL Right 2012    Benign     Family History   Problem Relation Age of Onset    Hypertension Mother     Heart Disease Mother         MI    Diabetes Father     Breast Cancer Maternal Aunt         Age Unknown    Kidney Disease Maternal Aunt     Anesth Problems Neg Hx      Outpatient Encounter Medications as of 2021   Medication Sig Dispense Refill    spironolactone-hydrochlorothiazide (ALDACTAZIDE) 25-25 mg per tablet Take 1 Tablet by mouth daily. 90 Tablet 0    [DISCONTINUED] spironolactone-hydrochlorothiazide (ALDACTAZIDE) 25-25 mg per tablet Take 1 Tablet by mouth daily. 90 Tablet 0     No facility-administered encounter medications on file as of 2021. HPI    Review of Systems   Constitutional: Negative. Respiratory: Negative. Cardiovascular: Negative. Gastrointestinal: Negative. Genitourinary: Negative. Musculoskeletal: Positive for joint pain. Negative for falls. Neurological: Negative. Psychiatric/Behavioral: Negative. Physical Exam  Vitals and nursing note reviewed. HENT:      Head: Normocephalic. Eyes:      Pupils: Pupils are equal, round, and reactive to light. Cardiovascular:      Rate and Rhythm: Normal rate and regular rhythm. Pulmonary:      Effort: Pulmonary effort is normal.      Breath sounds: Normal breath sounds. Abdominal:      Palpations: Abdomen is soft. Musculoskeletal:      Right lower leg: No edema. Left lower leg: No edema. Skin:     General: Skin is warm. Neurological:      Mental Status: She is alert and oriented to person, place, and time. Psychiatric:         Behavior: Behavior normal.         ASSESSMENT and PLAN  Diagnoses and all orders for this visit:    1. Wellness examination    2. Elevated BP without diagnosis of hypertension    3. Screening-pulmonary TB  -     QUANTIFERON-TB GOLD PLUS    4. Essential hypertension  -     LIPID PANEL; Future  -     spironolactone-hydrochlorothiazide (ALDACTAZIDE) 25-25 mg per tablet; Take 1 Tablet by mouth daily. 5. Obesity, morbid ([address]  -     METABOLIC PANEL, COMPREHENSIVE; Future  -     CBC WITH AUTOMATED DIFF; Future  -     HEMOGLOBIN A1C WITH EAG;  Future      Follow-up and Dispositions    · Return in about 6 months (around 3/13/2022), or if symptoms worsen or fail to improve, for Follow up.       lab results and schedule of future lab studies reviewed with patient  reviewed diet, exercise and weight control  cardiovascular risk and specific lipid/LDL goals reviewed  reviewed medications and side effects in detail

## 2021-09-16 LAB
ALBUMIN SERPL-MCNC: 4.6 G/DL (ref 3.8–4.9)
ALBUMIN/GLOB SERPL: 1.5 {RATIO} (ref 1.2–2.2)
ALP SERPL-CCNC: 96 IU/L (ref 44–121)
ALT SERPL-CCNC: 10 IU/L (ref 0–32)
AST SERPL-CCNC: 11 IU/L (ref 0–40)
BASOPHILS # BLD AUTO: 0.1 X10E3/UL (ref 0–0.2)
BASOPHILS NFR BLD AUTO: 1 %
BILIRUB SERPL-MCNC: 0.4 MG/DL (ref 0–1.2)
BUN SERPL-MCNC: 19 MG/DL (ref 6–24)
BUN/CREAT SERPL: 25 (ref 9–23)
CALCIUM SERPL-MCNC: 9.6 MG/DL (ref 8.7–10.2)
CHLORIDE SERPL-SCNC: 105 MMOL/L (ref 96–106)
CHOLEST SERPL-MCNC: 170 MG/DL (ref 100–199)
CO2 SERPL-SCNC: 26 MMOL/L (ref 20–29)
CREAT SERPL-MCNC: 0.77 MG/DL (ref 0.57–1)
EOSINOPHIL # BLD AUTO: 0.2 X10E3/UL (ref 0–0.4)
EOSINOPHIL NFR BLD AUTO: 1 %
ERYTHROCYTE [DISTWIDTH] IN BLOOD BY AUTOMATED COUNT: 11.7 % (ref 11.7–15.4)
EST. AVERAGE GLUCOSE BLD GHB EST-MCNC: 103 MG/DL
GAMMA INTERFERON BACKGROUND BLD IA-ACNC: 0.03 IU/ML
GLOBULIN SER CALC-MCNC: 3.1 G/DL (ref 1.5–4.5)
GLUCOSE SERPL-MCNC: 109 MG/DL (ref 65–99)
HBA1C MFR BLD: 5.2 % (ref 4.8–5.6)
HCT VFR BLD AUTO: 40.2 % (ref 34–46.6)
HDLC SERPL-MCNC: 42 MG/DL
HGB BLD-MCNC: 13.2 G/DL (ref 11.1–15.9)
IMM GRANULOCYTES # BLD AUTO: 0 X10E3/UL (ref 0–0.1)
IMM GRANULOCYTES NFR BLD AUTO: 0 %
IMP & REVIEW OF LAB RESULTS: NORMAL
LDLC SERPL CALC-MCNC: 117 MG/DL (ref 0–99)
LYMPHOCYTES # BLD AUTO: 2 X10E3/UL (ref 0.7–3.1)
LYMPHOCYTES NFR BLD AUTO: 18 %
M TB IFN-G BLD-IMP: NEGATIVE
M TB IFN-G CD4+ BCKGRND COR BLD-ACNC: 0.05 IU/ML
MCH RBC QN AUTO: 32.3 PG (ref 26.6–33)
MCHC RBC AUTO-ENTMCNC: 32.8 G/DL (ref 31.5–35.7)
MCV RBC AUTO: 98 FL (ref 79–97)
MITOGEN IGNF BLD-ACNC: >10 IU/ML
MONOCYTES # BLD AUTO: 0.8 X10E3/UL (ref 0.1–0.9)
MONOCYTES NFR BLD AUTO: 7 %
NEUTROPHILS # BLD AUTO: 8.1 X10E3/UL (ref 1.4–7)
NEUTROPHILS NFR BLD AUTO: 73 %
PLATELET # BLD AUTO: 249 X10E3/UL (ref 150–450)
POTASSIUM SERPL-SCNC: 3.6 MMOL/L (ref 3.5–5.2)
PROT SERPL-MCNC: 7.7 G/DL (ref 6–8.5)
QUANTIFERON INCUBATION, QF1T: NORMAL
QUANTIFERON TB2 AG: 0.04 IU/ML
RBC # BLD AUTO: 4.09 X10E6/UL (ref 3.77–5.28)
SERVICE CMNT-IMP: NORMAL
SODIUM SERPL-SCNC: 143 MMOL/L (ref 134–144)
TRIGL SERPL-MCNC: 58 MG/DL (ref 0–149)
VLDLC SERPL CALC-MCNC: 11 MG/DL (ref 5–40)
WBC # BLD AUTO: 11.2 X10E3/UL (ref 3.4–10.8)

## 2021-09-16 NOTE — PROGRESS NOTES
TB negative   A1c is good! LDL still elevated.  Ensure she continues with her better eating habits she has been implementing

## 2021-09-28 ENCOUNTER — TELEPHONE (OUTPATIENT)
Dept: INTERNAL MEDICINE CLINIC | Age: 56
End: 2021-09-28

## 2021-09-28 NOTE — TELEPHONE ENCOUNTER
Pt calling to get an update on TB screening paperwork that was dropped off in office 09/27/2021    Pt was informed that form completion takes a few days.  Pt voiced understanding

## 2022-01-04 ENCOUNTER — TRANSCRIBE ORDER (OUTPATIENT)
Dept: SCHEDULING | Age: 57
End: 2022-01-04

## 2022-01-04 DIAGNOSIS — R92.8 ABNORMAL MAMMOGRAM: Primary | ICD-10-CM

## 2022-01-25 ENCOUNTER — HOSPITAL ENCOUNTER (OUTPATIENT)
Dept: MAMMOGRAPHY | Age: 57
Discharge: HOME | End: 2022-01-25
Attending: INTERNAL MEDICINE
Payer: MEDICAID

## 2022-01-25 DIAGNOSIS — R92.8 ABNORMAL MAMMOGRAM: ICD-10-CM

## 2022-01-25 PROCEDURE — 76642 ULTRASOUND BREAST LIMITED: CPT

## 2022-02-08 DIAGNOSIS — I10 ESSENTIAL HYPERTENSION: ICD-10-CM

## 2022-02-08 RX ORDER — SPIRONOLACTONE AND HYDROCHLOROTHIAZIDE 25; 25 MG/1; MG/1
1 TABLET ORAL DAILY
Qty: 90 TABLET | Refills: 0 | Status: SHIPPED | OUTPATIENT
Start: 2022-02-08 | End: 2022-02-11 | Stop reason: SDUPTHER

## 2022-02-08 NOTE — TELEPHONE ENCOUNTER
----- Message from Sharron Roque sent at 2/8/2022  1:33 PM EST -----  Subject: Refill Request    QUESTIONS  Name of Medication? spironolactone-hydrochlorothiazide (ALDACTAZIDE) 25-25   mg per tablet  Patient-reported dosage and instructions? one tab by mouth 25mg  How many days do you have left? 12  [facility]? [id number]  Pharmacy phone number (if available)? [phone]  ---------------------------------------------------------------------------  --------------  Gabrielle DOOLEY  What is the best way for the office to contact you? OK to leave message on   voicemail  Preferred Call Back Phone Number?  [phone]

## 2022-02-11 DIAGNOSIS — I10 ESSENTIAL HYPERTENSION: ICD-10-CM

## 2022-02-11 RX ORDER — SPIRONOLACTONE AND HYDROCHLOROTHIAZIDE 25; 25 MG/1; MG/1
1 TABLET ORAL DAILY
Qty: 90 TABLET | Refills: 0 | Status: SHIPPED | OUTPATIENT
Start: 2022-02-11 | End: 2022-03-15 | Stop reason: SDUPTHER

## 2022-02-11 NOTE — TELEPHONE ENCOUNTER
Due to patient work schedule she need a pharmacy that stays open later. so she asking to transfer her med-(spironolactone-hydrochlorothiazide(aldactazide)25-25mg  The first refill request went to [facility]. please redo to the [address] location  lov-9/13/21

## 2022-03-15 ENCOUNTER — OFFICE VISIT (OUTPATIENT)
Dept: INTERNAL MEDICINE CLINIC | Age: 57
End: 2022-03-15
Payer: MEDICAID

## 2022-03-15 VITALS
RESPIRATION RATE: 18 BRPM | TEMPERATURE: 98.8 F | OXYGEN SATURATION: 96 % | HEIGHT: 69 IN | DIASTOLIC BLOOD PRESSURE: 80 MMHG | SYSTOLIC BLOOD PRESSURE: 118 MMHG | WEIGHT: 244.5 LBS | HEART RATE: 77 BPM | BODY MASS INDEX: 36.21 KG/M2

## 2022-03-15 DIAGNOSIS — Z23 ENCOUNTER FOR IMMUNIZATION: ICD-10-CM

## 2022-03-15 DIAGNOSIS — I10 ESSENTIAL HYPERTENSION: Primary | ICD-10-CM

## 2022-03-15 DIAGNOSIS — F17.200 CURRENT SMOKER: ICD-10-CM

## 2022-03-15 DIAGNOSIS — E78.00 ELEVATED LDL CHOLESTEROL LEVEL: ICD-10-CM

## 2022-03-15 PROCEDURE — 99214 OFFICE O/P EST MOD 30 MIN: CPT | Performed by: INTERNAL MEDICINE

## 2022-03-15 PROCEDURE — 90670 PCV13 VACCINE IM: CPT | Performed by: INTERNAL MEDICINE

## 2022-03-15 RX ORDER — SPIRONOLACTONE AND HYDROCHLOROTHIAZIDE 25; 25 MG/1; MG/1
1 TABLET ORAL DAILY
Qty: 90 TABLET | Refills: 1 | Status: SHIPPED | OUTPATIENT
Start: 2022-03-15

## 2022-03-15 NOTE — PATIENT INSTRUCTIONS
Vaccine Information Statement    Pneumococcal Conjugate Vaccine (PCV13): What You Need to Know    Many vaccine information statements are available in [place] and other languages. See www.immunize.org/vis. Hojas de información sobre vacunas están disponibles en español y en muchos otros idiomas. Visite www.immunize.org/vis. 1. Why get vaccinated? Pneumococcal conjugate vaccine (PCV13) can prevent pneumococcal disease. Pneumococcal disease refers to any illness caused by pneumococcal bacteria. These bacteria can cause many types of illnesses, including pneumonia, which is an infection of the lungs. Pneumococcal bacteria are one of the most common causes of pneumonia. Besides pneumonia, pneumococcal bacteria can also cause:   Ear infections   Sinus infections   Meningitis (infection of the tissue covering the brain and spinal cord)   Bacteremia (infection of the blood)    Anyone can get pneumococcal disease, but children under 3years old, people with certain medical conditions, adults 72 years or older, and cigarette smokers are at the highest risk. Most pneumococcal infections are mild. However, some can result in long-term problems, such as brain damage or hearing loss. Meningitis, bacteremia, and pneumonia caused by pneumococcal disease can be fatal.     2. PCV13     PCV13 protects against 13 types of bacteria that cause pneumococcal disease. Infants and young children usually need 4 doses of pneumococcal conjugate vaccine, at ages 3, 3, 10, and 1215 months. Older children (through age 62 months) may be vaccinated if they did not receive the recommended doses. A dose of PCV13 is also recommended for adults and children 6 years or older with certain medical conditions if they did not already receive PCV13. This vaccine may be given to healthy adults 72 years or older who did not already receive PCV13, based on discussions between the patient and health care provider.     3. Talk with your health care provider    Tell your vaccination provider if the person getting the vaccine:   Has had an allergic reaction after a previous dose of PCV13, to an earlier pneumococcal conjugate vaccine known as PCV7, or to any vaccine containing diphtheria toxoid (for example, DTaP), or has any severe, life-threatening allergies    In some cases, your health care provider may decide to postpone PCV13 vaccination until a future visit. People with minor illnesses, such as a cold, may be vaccinated. People who are moderately or severely ill should usually wait until they recover before getting PCV13. Your health care provider can give you more information. 4. Risks of a vaccine reaction     Redness, swelling, pain, or tenderness where the shot is given, and fever, loss of appetite, fussiness (irritability), feeling tired, headache, and chills can happen after PCV13 vaccination. [place] children may be at increased risk for seizures caused by fever after PCV13 if it is administered at the same time as inactivated influenza vaccine. Ask your health care provider for more information. People sometimes faint after medical procedures, including vaccination. Tell your provider if you feel dizzy or have vision changes or ringing in the ears. As with any medicine, there is a very remote chance of a vaccine causing a severe allergic reaction, other serious injury, or death. 5. What if there is a serious problem? An allergic reaction could occur after the vaccinated person leaves the clinic. If you see signs of a severe allergic reaction (hives, swelling of the face and throat, difficulty breathing, a fast heartbeat, dizziness, or weakness), call 9-1-1 and get the person to the nearest hospital.    For other signs that concern you, call your health care provider. Adverse reactions should be reported to the Vaccine Adverse Event Reporting System (VAERS).  Your health care provider will usually file this report, or you can do it yourself. Visit the VAERS website at www.vaers. [facility].gov or call 3-959.377.5367. VAERS is only for reporting reactions, and VAERS staff members do not give medical advice. 6. The National Vaccine Injury Compensation Program    The [facility] Vaccine Injury Compensation Program (VICP) is a federal program that was created to compensate people who may have been injured by certain vaccines. Claims regarding alleged injury or death due to vaccination have a time limit for filing, which may be as short as two years. Visit the VICP website at [facility].gov/vaccinecompensation or call 3-864.870.1745 to learn about the program and about filing a claim. 7. How can I learn more?  Ask your health care provider.  Call your local or state health department.  Visit the website of the Food and Drug Administration (FDA) for vaccine package inserts and additional information at www.fda.gov/vaccines-blood-biologics/vaccines.  Contact the Centers for Disease Control and Prevention (CDC):  - Call 1-484.505.1644 (1-800-CDC-INFO) or  - Visit CDCs website at www.cdc.gov/vaccines. Vaccine Information Statement   PCV13   8/6/2021  42 SHADI Martinez [id number]   Department of Health and Human Services  Centers for Disease Control and Prevention    Office Use Only

## 2022-03-15 NOTE — PROGRESS NOTES
Identified pt with two pt identifiers(name and ). Reviewed record in preparation for visit and have obtained necessary documentation. All patient medications has been reviewed. Chief Complaint   Patient presents with    Complete Physical       3 most recent PHQ Screens 3/15/2022   Little interest or pleasure in doing things Not at all   Feeling down, depressed, irritable, or hopeless Not at all   Total Score PHQ 2 0     Abuse Screening Questionnaire 3/15/2022   Do you ever feel afraid of your partner? N   Are you in a relationship with someone who physically or mentally threatens you? N   Is it safe for you to go home? Y       Health Maintenance Due   Topic    Pneumococcal 0-64 years (1 of 2 - PPSV23)    Shingrix Vaccine Age 49> (2 of 2)     Health Maintenance Review: Patient reminded of \"due or due soon\" health maintenance. I have asked the patient to contact his/her primary care provider (PCP) for follow-up on his/her health maintenance. Vitals:    03/15/22 1045   BP: 118/80   Pulse: 77   Resp: 18   Temp: 98.8 °F (37.1 °C)   TempSrc: Oral   SpO2: 96%   Weight: 244 lb 8 oz (110.9 kg)   Height: 5' 9\" (1.753 m)   PainSc:   0 - No pain       Wt Readings from Last 3 Encounters:   03/15/22 244 lb 8 oz (110.9 kg)   21 242 lb (109.8 kg)   21 249 lb (112.9 kg)     Temp Readings from Last 3 Encounters:   03/15/22 98.8 °F (37.1 °C) (Oral)   21 98.2 °F (36.8 °C) (Oral)   21 (!) 96 °F (35.6 °C) (Oral)     BP Readings from Last 3 Encounters:   03/15/22 118/80   21 128/74   21 132/80     Pulse Readings from Last 3 Encounters:   03/15/22 77   21 90   20 68       1. \"Have you been to the ER, urgent care clinic since your last visit? Hospitalized since your last visit? \" No    2. \"Have you seen or consulted any other health care providers outside of the [address] [place] [address] since your last visit? \" No     3.  For patients aged 39-70: Has the patient had a colonoscopy / FIT/ Cologuard? Yes - no Care Gap present      If the patient is female:    4. For patients aged 41-77: Has the patient had a mammogram within the past 2 years? Yes - Care Gap present. Rooming MA/LPN to request most recent results      5. For patients aged 21-65: Has the patient had a pap smear?  Yes - no Care Gap present

## 2022-03-15 NOTE — PROGRESS NOTES
HISTORY OF PRESENT ILLNESS  Elida Reece is a 49 y.o. female. Patient was seen for a follow up. Has moved jobs and is liking it. HTN: stable. Has been improving with her salt intake and food choices. Weight remains stable. lost 20 lbs since starting here. Mammogram is up to date. colonoscopy was also completed. Continues to smoke. Will get the PNA vaccine today. Visit Vitals  /80 (BP 1 Location: Right arm, BP Patient Position: Sitting, BP Cuff Size: Adult)   Pulse 77   Temp 98.8 °F (37.1 °C) (Oral)   Resp 18   Ht 5' 9\" (1.753 m)   Wt 244 lb 8 oz (110.9 kg)   LMP  (LMP Unknown)   SpO2 96%   BMI 36.11 kg/m²     Past Medical History:   Diagnosis Date    Adverse effect of anesthesia     DELAYED AWAKENING    Cyst of skin     right side of face    Essential hypertension 2016    PT DENIES    Menopause     LMP-    Thyroid disease     parathyroidectomy     Past Surgical History:   Procedure Laterality Date    HX  SECTION      HX HEENT      POLYPS ON VOCAL CORDS    HX OTHER SURGICAL  2016    PARATHYROIDECTOMY    HX PARTIAL HYSTERECTOMY      HX THYROIDECTOMY      KALLI STEREO VAC  BX BREAST LT 1ST LESION W/CLIP AND SPECIMEN Left ? Benign    US GUIDE ASP BREAST RT CYST W NDL Right 2012    Benign     Family History   Problem Relation Age of Onset    Hypertension Mother     Heart Disease Mother         MI    Diabetes Father     Breast Cancer Maternal Aunt         Age Unknown    Kidney Disease Maternal Aunt     Anesth Problems Neg Hx      Outpatient Encounter Medications as of 3/15/2022   Medication Sig Dispense Refill    spironolactone-hydrochlorothiazide (ALDACTAZIDE) 25-25 mg per tablet Take 1 Tablet by mouth daily. 90 Tablet 1    [DISCONTINUED] spironolactone-hydrochlorothiazide (ALDACTAZIDE) 25-25 mg per tablet Take 1 Tablet by mouth daily. 90 Tablet 0     No facility-administered encounter medications on file as of 3/15/2022.      HPI    Review of Systems   All other systems reviewed and are negative. Physical Exam  Vitals and nursing note reviewed. Cardiovascular:      Rate and Rhythm: Normal rate and regular rhythm. Pulmonary:      Effort: Pulmonary effort is normal.      Breath sounds: Normal breath sounds. Abdominal:      Palpations: Abdomen is soft. Musculoskeletal:         General: Normal range of motion. Skin:     General: Skin is warm. Neurological:      Mental Status: She is alert and oriented to person, place, and time. Psychiatric:         Behavior: Behavior normal.         ASSESSMENT and PLAN  Diagnoses and all orders for this visit:    1. Essential hypertension  -     spironolactone-hydrochlorothiazide (ALDACTAZIDE) 25-25 mg per tablet; Take 1 Tablet by mouth daily. 2. Elevated LDL cholesterol level        -     Reviewed DASH diet and lean choices   3.  Current smoker        -     Educated on smoking cessation     Follow-up and Dispositions    · Return in about 6 months (around 9/15/2022), or if symptoms worsen or fail to improve.       lab results and schedule of future lab studies reviewed with patient  reviewed diet, exercise and weight control  very strongly urged to quit smoking to reduce cardiovascular risk  cardiovascular risk and specific lipid/LDL goals reviewed  reviewed medications and side effects in detail

## 2022-03-18 PROBLEM — I96 SKIN FLAP NECROSIS (HCC): Status: ACTIVE | Noted: 2019-01-29

## 2022-03-18 PROBLEM — T86.828 SKIN FLAP NECROSIS (HCC): Status: ACTIVE | Noted: 2019-01-29

## 2022-03-19 PROBLEM — E66.01 OBESITY, MORBID (HCC): Status: ACTIVE | Noted: 2018-05-14

## 2022-10-25 ENCOUNTER — HOSPITAL ENCOUNTER (EMERGENCY)
Age: 57
Discharge: HOME | End: 2022-10-25
Attending: EMERGENCY MEDICINE
Payer: MEDICAID

## 2022-10-25 ENCOUNTER — APPOINTMENT (OUTPATIENT)
Dept: GENERAL RADIOLOGY | Age: 57
End: 2022-10-25
Attending: EMERGENCY MEDICINE
Payer: MEDICAID

## 2022-10-25 VITALS
TEMPERATURE: 99.2 F | OXYGEN SATURATION: 100 % | RESPIRATION RATE: 18 BRPM | BODY MASS INDEX: 34.86 KG/M2 | HEART RATE: 105 BPM | WEIGHT: 230 LBS | HEIGHT: 68 IN | DIASTOLIC BLOOD PRESSURE: 64 MMHG | SYSTOLIC BLOOD PRESSURE: 140 MMHG

## 2022-10-25 DIAGNOSIS — M17.11 TRICOMPARTMENT OSTEOARTHRITIS OF RIGHT KNEE: Primary | ICD-10-CM

## 2022-10-25 PROCEDURE — 99283 EMERGENCY DEPT VISIT LOW MDM: CPT

## 2022-10-25 PROCEDURE — 73562 X-RAY EXAM OF KNEE 3: CPT

## 2022-10-25 PROCEDURE — 74011250637 HC RX REV CODE- 250/637: Performed by: EMERGENCY MEDICINE

## 2022-10-25 RX ORDER — HYDROCODONE BITARTRATE AND ACETAMINOPHEN 5; 325 MG/1; MG/1
1 TABLET ORAL
Qty: 6 TABLET | Refills: 0 | Status: SHIPPED | OUTPATIENT
Start: 2022-10-25 | End: 2022-10-28

## 2022-10-25 RX ORDER — METHYLPREDNISOLONE 4 MG/1
TABLET ORAL
Qty: 1 DOSE PACK | Refills: 0 | Status: SHIPPED | OUTPATIENT
Start: 2022-10-25

## 2022-10-25 RX ORDER — NAPROXEN 250 MG/1
500 TABLET ORAL
Status: COMPLETED | OUTPATIENT
Start: 2022-10-25 | End: 2022-10-25

## 2022-10-25 RX ADMIN — NAPROXEN 500 MG: 250 TABLET ORAL at 09:00

## 2022-10-25 NOTE — ED NOTES
Pt presents to ED complaining of right knee swelling x 1 day. Pt states that the swelling has been intermittent for a while and that she spends a significant amount of time on her feet for work. Pt states her knee was significantly swollen this morning at approximately 0400, and decreased following elevation of the extremity. Pt endorses tenderness behind the knee and has hx of fluid drainage from the joint and denies falls, injury, and trauma to the area. Pt is alert and oriented x 4, RR even and unlabored, skin is warm and dry. Assessment completed and pt updated on plan of care. Call bell in reach. Emergency Department Nursing Plan of Care       The Nursing Plan of Care is developed from the Nursing assessment and Emergency Department Attending provider initial evaluation. The plan of care may be reviewed in the ED Provider note.     The Plan of Care was developed with the following considerations:   Patient / Family readiness to learn indicated by:verbalized understanding  Persons(s) to be included in education: patient  Barriers to Learning/Limitations:No    Signed     Simona Simpson RN    10/25/2022

## 2022-10-25 NOTE — ED NOTES
Discharge instructions were given to the patient by Melania King RN  . The patient left the Emergency Department ambulatory, alert and oriented and in no acute distress with 2 prescriptions. The patient was encouraged to call or return to the ED for worsening issues or problems and was encouraged to schedule a follow up appointment for continuing care. The patient verbalized understanding of discharge instructions and prescriptions, all questions were answered. The patient has no further concerns at this time. Patient has been instructed that they have been prescribed Norco* which contains opioids, benzodiazepines, or other sedating drugs. Patient is aware that they  will need to refrain from driving or operating heavy machinery after taking this medication. Patient also instructed that they need to avoid drinking alcohol and using other products containing opioids, benzodiazepines, or other sedating drugs. Patient verbalized understanding.

## 2022-10-25 NOTE — ED PROVIDER NOTES
EMERGENCY DEPARTMENT HISTORY AND PHYSICAL EXAM      Date: 10/25/2022  Patient Name: Keven Nelson    History of Presenting Illness     Chief Complaint   Patient presents with    Knee Pain     History Provided By: Patient    HPI: Keven Nelson, 56 y.o. female with no significant past medical history who presents via private vehicle to the ED with cc of atraumatic right knee pain and swelling that started yesterday. Patient states she has had these problems in the past and has even had her right knee drained at [facility]. She denies following up with orthopedic surgery. She does state that she stands on her feet for extended hours at a time while working. 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 This morning when she woke up, she noticed the knee was more swollen than normal and painful to put weight on. She elevated it for 5 hours and states the swelling has improved. Her pain is described as a throbbing/aching pain to the posterior knee as well as the proximal tibia that is worse with palpation and weightbearing. Nothing makes the pain better. She denies taking any medications for the symptoms prior to arrival.    PMHx: None  Social Hx: Smokes 1/2 pack/day, occasional alcohol use, denies illegal drug use    PCP: Ester Watkins NP    There are no other complaints, changes, or physical findings at this time. No current facility-administered medications on file prior to encounter. Current Outpatient Medications on File Prior to Encounter   Medication Sig Dispense Refill    spironolactone-hydrochlorothiazide (ALDACTAZIDE) 25-25 mg per tablet Take 1 Tablet by mouth daily.  90 Tablet 1     Past History     Past Medical History:  Past Medical History:   Diagnosis Date    Adverse effect of anesthesia     DELAYED AWAKENING    Cyst of skin 2016    right side of face    Essential hypertension 6/6/2016    PT DENIES    Menopause     LMP-2008    Thyroid disease     parathyroidectomy Past Surgical History:  Past Surgical History:   Procedure Laterality Date    HX  SECTION      HX HEENT      POLYPS ON VOCAL CORDS    HX OTHER SURGICAL  2016    PARATHYROIDECTOMY    HX PARTIAL HYSTERECTOMY  2008    HX THYROIDECTOMY  2015    KALLI STEREO VAC  BX BREAST LT 1ST LESION W/CLIP AND SPECIMEN Left ? Benign    US GUIDE ASP BREAST RT CYST W NDL Right 2012    Benign     Family History:  Family History   Problem Relation Age of Onset    Hypertension Mother     Heart Disease Mother         MI    Diabetes Father     Breast Cancer Maternal Aunt         Age Unknown    Kidney Disease Maternal Aunt     Anesth Problems Neg Hx      Social History:  Social History     Tobacco Use    Smoking status: Every Day     Packs/day: 0.50     Years: 39.00     Pack years: 19.50     Types: Cigarettes     Last attempt to quit: 2019     Years since quitting: 3.3    Smokeless tobacco: Never   Substance Use Topics    Alcohol use: Not Currently     Comment: OCCASIONAL    Drug use: No     Allergies: Allergies   Allergen Reactions    Latex Itching     Review of Systems   Review of Systems   Constitutional:  Negative for chills and fever. HENT:  Negative for congestion, rhinorrhea, sneezing and sore throat. Eyes:  Negative for redness and visual disturbance. Respiratory:  Negative for shortness of breath. Cardiovascular:  Negative for leg swelling. Gastrointestinal:  Negative for abdominal pain, nausea and vomiting. Genitourinary:  Negative for difficulty urinating and frequency. Musculoskeletal:  Positive for arthralgias and joint swelling. Negative for back pain, myalgias and neck stiffness. Skin:  Negative for rash. Neurological:  Negative for dizziness, syncope, weakness and headaches. Hematological:  Negative for adenopathy. All other systems reviewed and are negative. Physical Exam   Physical Exam  Vitals and nursing note reviewed.    Constitutional:       Appearance: Normal appearance. She is well-developed. HENT:      Head: Normocephalic and atraumatic. Eyes:      Conjunctiva/sclera: Conjunctivae normal.   Cardiovascular:      Rate and Rhythm: Normal rate and regular rhythm. Pulses: Normal pulses. Heart sounds: Normal heart sounds, S1 normal and S2 normal.   Pulmonary:      Effort: Pulmonary effort is normal. No respiratory distress. Breath sounds: Normal breath sounds. No wheezing. Abdominal:      General: Bowel sounds are normal. There is no distension. Palpations: Abdomen is soft. Tenderness: There is no abdominal tenderness. There is no rebound. Musculoskeletal:         General: Normal range of motion. Cervical back: Full passive range of motion without pain, normal range of motion and neck supple. Right knee: Swelling, effusion and bony tenderness present. No deformity. Tenderness present over the medial joint line and lateral joint line. Left knee: Normal.   Skin:     General: Skin is warm and dry. Findings: No rash. Neurological:      Mental Status: She is alert and oriented to person, place, and time. Psychiatric:         Speech: Speech normal.         Behavior: Behavior normal.         Thought Content: Thought content normal.         Judgment: Judgment normal.     Diagnostic Study Results   Labs -   No results found for this or any previous visit (from the past 12 hour(s)). Radiologic Studies -   XR KNEE RT 3 V   Final Result   No acute osseous abnormality. Joint effusion. Interval progression of tricompartmental osteoarthrosis, most pronounced in the   patellofemoral compartment. XR KNEE RT 3 V    Result Date: 10/25/2022  No acute osseous abnormality. Joint effusion. Interval progression of tricompartmental osteoarthrosis, most pronounced in the patellofemoral compartment. Medical Decision Making   I am the first provider for this patient.     I reviewed the vital signs, available nursing notes, past medical history, past surgical history, family history and social history. Vital Signs-Reviewed the patient's vital signs. Patient Vitals for the past 24 hrs:   Temp Pulse Resp BP SpO2   10/25/22 0832 99.2 °F (37.3 °C) (!) 105 18 (!) 140/64 100 %     Pulse Oximetry Analysis - 100% on RA (normal)    Records Reviewed: Nursing Notes, Old Medical Records, Previous Radiology Studies, and Previous Laboratory Studies    Provider Notes (Medical Decision Making):   21-year-old female presents with atraumatic right knee pain and swelling that worsened overnight. Differential includes osteoarthritis, internal knee derangement, effusion, and low suspicion for Baker's cyst or DVT. Discussed with patient that I believe this is likely osteoarthritis but will obtain imaging to further assess. Patient's knee is not warm to the touch and I have low concern for septic arthritis. Will defer an arthrocentesis at this time. Will treat with analgesics, a Medrol Dosepak, ice, crutches, and have her follow-up with orthopedic surgery. ED Course:   Initial assessment performed. The patients presenting problems have been discussed, and they are in agreement with the care plan formulated and outlined with them. I have encouraged them to ask questions as they arise throughout their visit. Progress Note  10:24 AM  I have re-evaluated pt and her x-ray shows an effusion with tricompartmental osteoarthrosis of the right knee without acute pathology. Will discharge with a prescription for a few hydrocodone, a Medrol Dosepak, crutches, rest, ice, compression, and elevation, and orthopedics follow-up. Progress Note:   Updated pt on all returned results and findings. Discussed the importance of proper follow up as referred below along with return precautions. Pt in agreement with the care plan and expresses agreement with and understanding of all items discussed. Disposition:  Discharge Note:  The pt is ready for discharge. The pt's signs, symptoms, diagnosis, and discharge instructions have been discussed and pt has conveyed their understanding. The pt is to follow up as recommended or return to ER should their symptoms worsen. Plan has been discussed and pt is in agreement. PLAN:  1. Current Discharge Medication List        START taking these medications    Details   methylPREDNISolone (Medrol, Claude,) 4 mg tablet Take as directed on packaging label for 6-day taper  Qty: 1 Dose Pack, Refills: 0  Start date: 10/25/2022      HYDROcodone-acetaminophen (Norco) 5-325 mg per tablet Take 1 Tablet by mouth every six (6) hours as needed for Pain for up to 3 days. Max Daily Amount: 4 Tablets. Qty: 6 Tablet, Refills: 0  Start date: 10/25/2022, End date: 10/28/2022    Associated Diagnoses: Tricompartment osteoarthritis of right knee           2. Follow-up Information       Follow up With Specialties Details Why Contact Info    Jerardo Aguiar NP Nurse Practitioner, Bariatrics Schedule an appointment as soon as possible for a visit   [address] [place], [place] [id number]  [phone]      Last Mahan DO Orthopedic Surgery Schedule an appointment as soon as possible for a visit   [address]      Reyes Ordonez MD Orthopedic Surgery Schedule an appointment as soon as possible for a visit   [address] [place] [id number] [phone]      [facility] EMERGENCY DEPT Emergency Medicine  As needed, If symptoms worsen [facility]  [phone]          Return to ED if worse     Diagnosis     Clinical Impression:   1. Tricompartment osteoarthritis of right knee            Please note that this dictation was completed with Dragon, computer voice recognition software. Quite often unanticipated grammatical, syntax, homophones, and other interpretive errors are inadvertently transcribed by the computer software.   Please disregard these errors. Additionally, please excuse any errors that have escaped final proofreading.

## 2022-10-25 NOTE — Clinical Note
[facility] EMERGENCY DEPT  [address] [address] [phone]    Work/School Note    Date: 10/25/2022    To Whom It May concern:    Alex Noonan was seen and treated today in the emergency room by the following provider(s):  Attending Provider: Hiral Kamara MD.      Alex Noonan is excused from work/school on 10/25/22 and 10/26/22. She is medically clear to return to work/school on 10/27/2022.        Sincerely,          Christiane Canales MD

## 2022-10-26 ENCOUNTER — TELEPHONE (OUTPATIENT)
Dept: ORTHOPEDIC SURGERY | Age: 57
End: 2022-10-26

## 2022-10-26 NOTE — TELEPHONE ENCOUNTER
LVM for patient to call back to schedule ED F/U Apt. Patient called back and scheduled for 10/28/22. Patient requested a phone call and a  with address. I called the patient back per her request and LVM with the office address and appointment time.

## 2022-12-28 DIAGNOSIS — I10 ESSENTIAL HYPERTENSION: ICD-10-CM

## 2022-12-28 RX ORDER — SPIRONOLACTONE AND HYDROCHLOROTHIAZIDE 25; 25 MG/1; MG/1
1 TABLET ORAL DAILY
Qty: 90 TABLET | Refills: 1 | Status: SHIPPED | OUTPATIENT
Start: 2022-12-28

## 2022-12-28 NOTE — TELEPHONE ENCOUNTER
Requested Prescriptions     Pending Prescriptions Disp Refills    spironolactone-hydrochlorothiazide (ALDACTAZIDE) 25-25 mg per tablet 90 Tablet 1     Sig: Take 1 Tablet by mouth daily. [facility] [address]  [place]  [address] [address]  Phone: [phone] Fax: [phone]       3/15/2022 is LAST OFFICE VISIT     No future appointments.

## 2023-06-09 ENCOUNTER — HOSPITAL ENCOUNTER (OUTPATIENT)
Facility: HOSPITAL | Age: 58
Setting detail: OUTPATIENT SURGERY
Discharge: HOME | End: 2023-06-09
Attending: INTERNAL MEDICINE | Admitting: INTERNAL MEDICINE
Payer: MEDICAID

## 2023-06-09 ENCOUNTER — ANESTHESIA (OUTPATIENT)
Facility: HOSPITAL | Age: 58
End: 2023-06-09
Payer: MEDICAID

## 2023-06-09 ENCOUNTER — ANESTHESIA EVENT (OUTPATIENT)
Facility: HOSPITAL | Age: 58
End: 2023-06-09
Payer: MEDICAID

## 2023-06-09 VITALS
BODY MASS INDEX: 36.83 KG/M2 | SYSTOLIC BLOOD PRESSURE: 134 MMHG | HEIGHT: 68 IN | TEMPERATURE: 96.8 F | OXYGEN SATURATION: 93 % | DIASTOLIC BLOOD PRESSURE: 70 MMHG | RESPIRATION RATE: 20 BRPM | HEART RATE: 73 BPM | WEIGHT: 243 LBS

## 2023-06-09 PROCEDURE — 7100000011 HC PHASE II RECOVERY - ADDTL 15 MIN: Performed by: INTERNAL MEDICINE

## 2023-06-09 PROCEDURE — 7100000010 HC PHASE II RECOVERY - FIRST 15 MIN: Performed by: INTERNAL MEDICINE

## 2023-06-09 PROCEDURE — 3600007502: Performed by: INTERNAL MEDICINE

## 2023-06-09 PROCEDURE — 3700000001 HC ADD 15 MINUTES (ANESTHESIA): Performed by: INTERNAL MEDICINE

## 2023-06-09 PROCEDURE — 3600007512: Performed by: INTERNAL MEDICINE

## 2023-06-09 PROCEDURE — 2709999900 HC NON-CHARGEABLE SUPPLY: Performed by: INTERNAL MEDICINE

## 2023-06-09 PROCEDURE — 3700000000 HC ANESTHESIA ATTENDED CARE: Performed by: INTERNAL MEDICINE

## 2023-06-09 PROCEDURE — C1889 IMPLANT/INSERT DEVICE, NOC: HCPCS | Performed by: INTERNAL MEDICINE

## 2023-06-09 DEVICE — WORKING LENGTH 235CM, WORKING CHANNEL 2.8MM
Type: IMPLANTABLE DEVICE | Site: SIGMOID COLON | Status: FUNCTIONAL
Brand: RESOLUTION 360 CLIP

## 2023-06-09 RX ORDER — SODIUM CHLORIDE 0.9 % (FLUSH) 0.9 %
5-40 SYRINGE (ML) INJECTION PRN
Status: DISCONTINUED | OUTPATIENT
Start: 2023-06-09 | End: 2023-06-09 | Stop reason: HOSPADM

## 2023-06-09 RX ORDER — SODIUM CHLORIDE 9 MG/ML
25 INJECTION, SOLUTION INTRAVENOUS PRN
Status: DISCONTINUED | OUTPATIENT
Start: 2023-06-09 | End: 2023-06-09 | Stop reason: HOSPADM

## 2023-06-09 RX ORDER — 0.9 % SODIUM CHLORIDE 0.9 %
INTRAVENOUS SOLUTION INTRAVENOUS PRN
Status: DISCONTINUED | OUTPATIENT
Start: 2023-06-09 | End: 2023-06-09 | Stop reason: SDUPTHER

## 2023-06-09 RX ORDER — FENTANYL CITRATE 50 UG/ML
INJECTION, SOLUTION INTRAMUSCULAR; INTRAVENOUS PRN
Status: DISCONTINUED | OUTPATIENT
Start: 2023-06-09 | End: 2023-06-09 | Stop reason: SDUPTHER

## 2023-06-09 RX ORDER — LIDOCAINE HYDROCHLORIDE 20 MG/ML
INJECTION, SOLUTION EPIDURAL; INFILTRATION; INTRACAUDAL; PERINEURAL PRN
Status: DISCONTINUED | OUTPATIENT
Start: 2023-06-09 | End: 2023-06-09 | Stop reason: SDUPTHER

## 2023-06-09 RX ORDER — SODIUM CHLORIDE 0.9 % (FLUSH) 0.9 %
5-40 SYRINGE (ML) INJECTION EVERY 12 HOURS SCHEDULED
Status: DISCONTINUED | OUTPATIENT
Start: 2023-06-09 | End: 2023-06-09 | Stop reason: HOSPADM

## 2023-06-09 RX ADMIN — FENTANYL CITRATE 100 MCG: 50 INJECTION, SOLUTION INTRAMUSCULAR; INTRAVENOUS at 11:19

## 2023-06-09 RX ADMIN — LIDOCAINE HYDROCHLORIDE 100 MG: 20 INJECTION, SOLUTION EPIDURAL; INFILTRATION; INTRACAUDAL; PERINEURAL at 11:19

## 2023-06-09 RX ADMIN — Medication 400 ML: at 11:38

## 2023-06-09 ASSESSMENT — LIFESTYLE VARIABLES: SMOKING_STATUS: 1

## 2023-06-09 ASSESSMENT — PAIN - FUNCTIONAL ASSESSMENT: PAIN_FUNCTIONAL_ASSESSMENT: NONE - DENIES PAIN

## 2023-06-09 NOTE — ANESTHESIA PRE PROCEDURE
Department of Anesthesiology  Preprocedure Note       Name:  Sage Salgado   Age:  56 y.o.  :  1965                                          MRN:  [id number]         Date:  2023      Surgeon: Corie Salas):  Sindy Estrada MD    Procedure: Procedure(s):  COLONOSCOPY    Medications prior to admission:   Prior to Admission medications    Medication Sig Start Date End Date Taking? Authorizing Provider   spironolactone-hydroCHLOROthiazide (ALDACTAZIDE) 25-25 MG per tablet Take 1 tablet by mouth daily 22   Ar Automatic Reconciliation       Current medications:    No current facility-administered medications for this encounter. 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  PARATHYROIDECTOMY      PARTIAL HYSTERECTOMY (CERVIX NOT REMOVED)       ASP BREAST CYST RIGHT Right 2012    Benign       Social History:    Social History     Tobacco Use    Smoking status: Every Day     Packs/day: 0.50     Years: 40.00     Pack years: 20.00     Types: Cigarettes     Last attempt to quit: 2019     Years since quittin.0    Smokeless tobacco: Never   Substance Use Topics    Alcohol use: Yes     Alcohol/week: 7.0 standard drinks     Types: 7 Cans of beer per week                                Ready to quit: Not Answered  Counseling given: Not Answered      Vital Signs (Current): There were no vitals filed for this visit.                                            BP Readings from Last 3 Encounters:   03/15/22 118/80   21 128/74   21 132/80       NPO Status:

## 2023-06-09 NOTE — DISCHARGE INSTRUCTIONS
Patito Gerardjony  [id number]  1965    It was my pleasure seeing you for your procedure. You will also receive a summary report with the findings from this procedure and any further recommendations. If you had polyps removed or biopsies taken during your procedure, you will receive a separate letter from me within the next 2 weeks. If you don't receive this letter or if you have any questions, please call my office [phone]. Please take note of the post procedure instructions listed below. Best Wishes,    Dr. Breana De La Cruz    These instructions give you information on caring for yourself after your procedure. Call your doctor if you have any problems or questions after your procedure. HOME CARE  Walk if you have belly cramping or gas. Walking will help get rid of the air and reduce the bloated feeling in your belly (abdomen). Your IV site (where you received drugs) may be tender to touch. Place warm towels on the site; keep your arm up on two pillows if you have any swelling or soreness in the area. You may shower. ACTIVITY:  Take frequent rest periods and move at a slower pace for the next 24 hours. .  You may resume your regular activity tomorrow if you are feeling back to normal.  Do not drive or ride a bicycle for at least 24 hours (because of the medicine (anesthesia) used during the test). Do not sign any important legal documents or use or operate any machinery for 24 hours  Do not take sleeping medicines/nerve drugs for 24 hours unless the doctor tells you. You can return to work/school tomorrow unless otherwise instructed. NUTRITION:  Drink plenty of fluids to keep your pee (urine) clear or pale yellow  Begin with a light meal and progress to your normal diet. Heavy or fried foods are harder to digest and may make you feel sick to your stomach (nauseated).   Once you are feeling back to normal, you may resume your normal diet as

## 2023-06-09 NOTE — H&P
Physical Exam:   General: no distress   HEENT: Head: Normocephalic, no lesions, without obvious abnormality. Heart: regular rate and rhythm, S1, S2 normal, no murmur, click, rub or gallop   Lungs: chest clear, no wheezing, rales, normal symmetric air entry   Abdominal: Bowel sounds are normal, liver is not enlarged, spleen is not enlarged   Neurological: Grossly normal   Extremities: extremities normal, atraumatic, no cyanosis or edema     Plan of Care/Planned Procedure: colonoscopy  The heart, lungs and mental status were satisfactory for the administration of MAC sedation and for the procedure. Informed consent was obtained for the procedure, including sedation. Risks of perforation, hemorrhage, adverse drug reaction, and aspiration were discussed. The risks, benefits and alternatives were again reiterated to the patient to include the risk of infection, bleeding, medication reaction, aspiration, perforation which could require immediate surgery, cardiopulmonary complication, issues with anesthesia and death. The patient was counseled at length about the risks of pushpa Covid-19 in the cinthya-operative and post-operative states including the recovery window of their procedure. The patient was made aware that pushpa Covid-19 after a surgical procedure may worsen their prognosis for recovering from the virus and lend to a higher morbidity and or mortality risk. The patient was given the options of postponing their procedure. All of the risks, benefits, and alternatives were discussed. The patient does  wish to proceed with the procedure.

## 2023-06-09 NOTE — PROGRESS NOTES
ARRIVAL INFORMATION:  Verified patient name and date of birth, scheduled procedure, and informed consent. : Mariama Mccollum  (sister) contact number:  Physician and staff can share information with the . Belongings with patient include:  Clothing,Jewelry, Cell Phone    GI FOCUSED ASSESSMENT:  Neuro: Awake, alert, oriented x4  Respiratory: even and unlabored   GI: soft and non-distended  EKG Rhythm: normal sinus rhythm and BBB    Education:Reviewed general discharge instructions and  information.

## 2023-06-09 NOTE — OP NOTE
normal medications.  - Recommend repeat colonoscopy pending path  - no NSAIDS for 7 days  - discussed w/ Emma    Discharge Disposition:  Home in the company of a  when able to ambulate.     Jonothan Curling, MD  6/9/2023  11:34 AM

## 2023-06-09 NOTE — PERIOP NOTE
Endoscopy Case End Note:    ***:  Procedure scope was pre-cleaned, per protocol, at bedside by [facility] ET.      ***:  Report received from anesthesia Marcia Diehl CRNA. See anesthesia flowsheet for intra-procedure vital signs and events.

## 2023-06-12 NOTE — ANESTHESIA POSTPROCEDURE EVALUATION
Department of Anesthesiology  Postprocedure Note    Patient: Kei Gandhi  MRN: [id number]  YOB: 1965  Date of evaluation: 6/12/2023      Procedure Summary     Date: 06/09/23 Room / Location: [facility] ENDO 03 / [facility] END[facility]COPY    Anesthesia Start: 1114 Anesthesia Stop: 2696    Procedure: COLONOSCOPY with polypectomywith resolution clip (Lower GI Region) Diagnosis:       Personal history of colonic polyps      (Personal history of colonic polyps [Z86.010])    Surgeons:  Jackie Davis MD Responsible Provider: Brittny Boss MD    Anesthesia Type: MAC ASA Status: 2          Anesthesia Type: MAC    Jerald Phase I: Jerald Score: 10    Jerald Phase II: Jerald Score: 10      Anesthesia Post Evaluation    Patient location during evaluation: PACU  Patient participation: complete - patient participated  Level of consciousness: sleepy but conscious and responsive to verbal stimuli  Airway patency: patent  Nausea & Vomiting: no vomiting and no nausea  Complications: no  Cardiovascular status: blood pressure returned to baseline and hemodynamically stable  Respiratory status: acceptable  Hydration status: stable

## 2023-07-31 RX ORDER — SPIRONOLACTONE AND HYDROCHLOROTHIAZIDE 25; 25 MG/1; MG/1
1 TABLET ORAL DAILY
Qty: 90 TABLET | OUTPATIENT
Start: 2023-07-31

## 2023-08-25 DIAGNOSIS — I10 ESSENTIAL (PRIMARY) HYPERTENSION: Primary | ICD-10-CM

## 2023-08-25 RX ORDER — SPIRONOLACTONE AND HYDROCHLOROTHIAZIDE 25; 25 MG/1; MG/1
1 TABLET ORAL DAILY
Qty: 30 TABLET | Refills: 0 | Status: SHIPPED | OUTPATIENT
Start: 2023-08-25 | End: 2023-08-29

## 2023-08-25 NOTE — TELEPHONE ENCOUNTER
Requested Prescriptions     Pending Prescriptions Disp Refills    spironolactone-hydroCHLOROthiazide (ALDACTAZIDE) 25-25 MG per tablet [Pharmacy Med Name: SPIRONOLACTONE 25MG W/HCTZ 25MG TAB] 90 tablet 0     Sig: TAKE 1 TABLET BY MOUTH DAILY         [id number] Jahaira Nearing, [address] [place] [address] Caleb Cardenas - RACHANA [phone] - F [phone]  [address] [id number]  Phone: [phone] Fax: [phone]       Last appt 3/15/2022      Future Appointments   Date Time Provider [address] [place] [address]   8/29/2023  9:45 AM NEREYDA Richards - [facility]

## 2023-08-29 ENCOUNTER — OFFICE VISIT (OUTPATIENT)
Age: 58
End: 2023-08-29
Payer: MEDICAID

## 2023-08-29 VITALS
DIASTOLIC BLOOD PRESSURE: 79 MMHG | HEIGHT: 68 IN | BODY MASS INDEX: 36.83 KG/M2 | SYSTOLIC BLOOD PRESSURE: 132 MMHG | WEIGHT: 243 LBS | OXYGEN SATURATION: 99 % | HEART RATE: 72 BPM | TEMPERATURE: 97.7 F | RESPIRATION RATE: 19 BRPM

## 2023-08-29 DIAGNOSIS — M79.89 LEG SWELLING: ICD-10-CM

## 2023-08-29 DIAGNOSIS — E66.01 MORBID (SEVERE) OBESITY DUE TO EXCESS CALORIES (HCC): ICD-10-CM

## 2023-08-29 DIAGNOSIS — I10 ESSENTIAL (PRIMARY) HYPERTENSION: ICD-10-CM

## 2023-08-29 DIAGNOSIS — I10 ESSENTIAL (PRIMARY) HYPERTENSION: Primary | ICD-10-CM

## 2023-08-29 DIAGNOSIS — Z87.891 PERSONAL HISTORY OF TOBACCO USE: ICD-10-CM

## 2023-08-29 DIAGNOSIS — Z12.31 BREAST CANCER SCREENING BY MAMMOGRAM: ICD-10-CM

## 2023-08-29 LAB
BASOPHILS # BLD AUTO: 0 X10E3/UL (ref 0–0.2)
BASOPHILS NFR BLD AUTO: 0 %
EOSINOPHIL # BLD AUTO: 0.2 X10E3/UL (ref 0–0.4)
EOSINOPHIL NFR BLD AUTO: 2 %
ERYTHROCYTE [DISTWIDTH] IN BLOOD BY AUTOMATED COUNT: 11.6 % (ref 11.7–15.4)
HCT VFR BLD AUTO: 40.3 % (ref 34–46.6)
HGB BLD-MCNC: 13.2 G/DL (ref 11.1–15.9)
IMM GRANULOCYTES # BLD AUTO: 0 X10E3/UL (ref 0–0.1)
IMM GRANULOCYTES NFR BLD AUTO: 0 %
LYMPHOCYTES # BLD AUTO: 2 X10E3/UL (ref 0.7–3.1)
LYMPHOCYTES NFR BLD AUTO: 27 %
MCH RBC QN AUTO: 32.5 PG (ref 26.6–33)
MCHC RBC AUTO-ENTMCNC: 32.8 G/DL (ref 31.5–35.7)
MCV RBC AUTO: 99 FL (ref 79–97)
MONOCYTES # BLD AUTO: 0.6 X10E3/UL (ref 0.1–0.9)
MONOCYTES NFR BLD AUTO: 8 %
NEUTROPHILS # BLD AUTO: 4.7 X10E3/UL (ref 1.4–7)
NEUTROPHILS NFR BLD AUTO: 63 %
PLATELET # BLD AUTO: 234 X10E3/UL (ref 150–450)
RBC # BLD AUTO: 4.06 X10E6/UL (ref 3.77–5.28)
WBC # BLD AUTO: 7.5 X10E3/UL (ref 3.4–10.8)

## 2023-08-29 PROCEDURE — 99214 OFFICE O/P EST MOD 30 MIN: CPT | Performed by: INTERNAL MEDICINE

## 2023-08-29 PROCEDURE — G0296 VISIT TO DETERM LDCT ELIG: HCPCS | Performed by: INTERNAL MEDICINE

## 2023-08-29 PROCEDURE — 3078F DIAST BP <80 MM HG: CPT | Performed by: INTERNAL MEDICINE

## 2023-08-29 PROCEDURE — 3075F SYST BP GE 130 - 139MM HG: CPT | Performed by: INTERNAL MEDICINE

## 2023-08-29 RX ORDER — HYDROCHLOROTHIAZIDE 25 MG/1
25 TABLET ORAL EVERY MORNING
Qty: 90 TABLET | Refills: 1 | Status: SHIPPED | OUTPATIENT
Start: 2023-08-29

## 2023-08-29 SDOH — ECONOMIC STABILITY: FOOD INSECURITY: WITHIN THE PAST 12 MONTHS, THE FOOD YOU BOUGHT JUST DIDN'T LAST AND YOU DIDN'T HAVE MONEY TO GET MORE.: NEVER TRUE

## 2023-08-29 SDOH — ECONOMIC STABILITY: FOOD INSECURITY: WITHIN THE PAST 12 MONTHS, YOU WORRIED THAT YOUR FOOD WOULD RUN OUT BEFORE YOU GOT MONEY TO BUY MORE.: NEVER TRUE

## 2023-08-29 SDOH — ECONOMIC STABILITY: HOUSING INSECURITY
IN THE LAST 12 MONTHS, WAS THERE A TIME WHEN YOU DID NOT HAVE A STEADY PLACE TO SLEEP OR SLEPT IN A SHELTER (INCLUDING NOW)?: NO

## 2023-08-29 SDOH — ECONOMIC STABILITY: INCOME INSECURITY: HOW HARD IS IT FOR YOU TO PAY FOR THE VERY BASICS LIKE FOOD, HOUSING, MEDICAL CARE, AND HEATING?: NOT HARD AT ALL

## 2023-08-29 ASSESSMENT — ENCOUNTER SYMPTOMS
COUGH: 0
GASTROINTESTINAL NEGATIVE: 1
SHORTNESS OF BREATH: 0
CHEST TIGHTNESS: 0

## 2023-08-29 ASSESSMENT — PATIENT HEALTH QUESTIONNAIRE - PHQ9
SUM OF ALL RESPONSES TO PHQ QUESTIONS 1-9: 0
2. FEELING DOWN, DEPRESSED OR HOPELESS: 0
SUM OF ALL RESPONSES TO PHQ9 QUESTIONS 1 & 2: 0
1. LITTLE INTEREST OR PLEASURE IN DOING THINGS: 0
SUM OF ALL RESPONSES TO PHQ QUESTIONS 1-9: 0

## 2023-08-29 NOTE — PROGRESS NOTES
Discussed with the patient the current USPSTF guidelines released March 9, 2021 for screening for lung cancer. For adults aged 48 to 80 years who have a 20 pack-year smoking history and currently smoke or have quit within the past 15 years the grade B recommendation is to:  Screen for lung cancer with low-dose computed tomography (LDCT) every year. Stop screening once a person has not smoked for 15 years or has a health problem that limits life expectancy or the ability to have lung surgery. The patient  reports that she has been smoking cigarettes. She has a 20.00 pack-year smoking history. She has never used smokeless tobacco.. Discussed with patient the risks and benefits of screening, including over-diagnosis, false positive rate, and total radiation exposure. The patient currently exhibits no signs or symptoms suggestive of lung cancer. Discussed with patient the importance of compliance with yearly annual lung cancer screenings and willingness to undergo diagnosis and treatment if screening scan is positive. In addition, the patient was counseled regarding the importance of remaining smoke free and/or total smoking cessation.     Also reviewed the following if the patient has Medicare that as of February 10, 2022, Medicare only covers LDCT screening in patients aged 53-69 with at least a 20 pack-year smoking history who currently smoke or have quit in the last 15 years

## 2023-08-30 LAB
ALBUMIN SERPL-MCNC: 4.4 G/DL (ref 3.8–4.9)
ALBUMIN/GLOB SERPL: 1.6 {RATIO} (ref 1.2–2.2)
ALP SERPL-CCNC: 76 IU/L (ref 44–121)
ALT SERPL-CCNC: 9 IU/L (ref 0–32)
AST SERPL-CCNC: 14 IU/L (ref 0–40)
BILIRUB SERPL-MCNC: 0.4 MG/DL (ref 0–1.2)
BUN SERPL-MCNC: 17 MG/DL (ref 6–24)
BUN/CREAT SERPL: 22 (ref 9–23)
CALCIUM SERPL-MCNC: 9.4 MG/DL (ref 8.7–10.2)
CHLORIDE SERPL-SCNC: 101 MMOL/L (ref 96–106)
CHOLEST SERPL-MCNC: 161 MG/DL (ref 100–199)
CO2 SERPL-SCNC: 25 MMOL/L (ref 20–29)
CREAT SERPL-MCNC: 0.76 MG/DL (ref 0.57–1)
EGFRCR SERPLBLD CKD-EPI 2021: 92 ML/MIN/1.73
GLOBULIN SER CALC-MCNC: 2.7 G/DL (ref 1.5–4.5)
GLUCOSE SERPL-MCNC: 92 MG/DL (ref 70–99)
HDLC SERPL-MCNC: 42 MG/DL
IMP & REVIEW OF LAB RESULTS: NORMAL
LDLC SERPL CALC-MCNC: 106 MG/DL (ref 0–99)
POTASSIUM SERPL-SCNC: 3.9 MMOL/L (ref 3.5–5.2)
PROT SERPL-MCNC: 7.1 G/DL (ref 6–8.5)
SODIUM SERPL-SCNC: 139 MMOL/L (ref 134–144)
TRIGL SERPL-MCNC: 68 MG/DL (ref 0–149)
TSH SERPL DL<=0.005 MIU/L-ACNC: 2.63 UIU/ML (ref 0.45–4.5)
VLDLC SERPL CALC-MCNC: 13 MG/DL (ref 5–40)

## 2023-09-19 ENCOUNTER — HOSPITAL ENCOUNTER (OUTPATIENT)
Facility: HOSPITAL | Age: 58
Discharge: HOME | End: 2023-09-22
Payer: MEDICAID

## 2023-09-19 VITALS — BODY MASS INDEX: 38.95 KG/M2 | WEIGHT: 257 LBS | HEIGHT: 68 IN

## 2023-09-19 DIAGNOSIS — Z87.891 PERSONAL HISTORY OF TOBACCO USE: ICD-10-CM

## 2023-09-19 DIAGNOSIS — Z12.31 BREAST CANCER SCREENING BY MAMMOGRAM: ICD-10-CM

## 2023-09-19 PROCEDURE — 77063 BREAST TOMOSYNTHESIS BI: CPT

## 2023-09-19 PROCEDURE — 71271 CT THORAX LUNG CANCER SCR C-: CPT

## 2023-09-21 DIAGNOSIS — I10 ESSENTIAL (PRIMARY) HYPERTENSION: ICD-10-CM

## 2023-09-21 RX ORDER — SPIRONOLACTONE AND HYDROCHLOROTHIAZIDE 25; 25 MG/1; MG/1
1 TABLET ORAL DAILY
Qty: 90 TABLET | Refills: 1 | Status: SHIPPED | OUTPATIENT
Start: 2023-09-21

## 2024-02-11 DIAGNOSIS — M79.89 LEG SWELLING: ICD-10-CM

## 2024-02-11 DIAGNOSIS — I10 ESSENTIAL (PRIMARY) HYPERTENSION: ICD-10-CM

## 2024-02-12 RX ORDER — HYDROCHLOROTHIAZIDE 25 MG/1
25 TABLET ORAL EVERY MORNING
Qty: 90 TABLET | Refills: 1 | Status: SHIPPED | OUTPATIENT
Start: 2024-02-12

## 2024-11-01 ENCOUNTER — OFFICE VISIT (OUTPATIENT)
Age: 59
End: 2024-11-01
Payer: MEDICAID

## 2024-11-01 VITALS
DIASTOLIC BLOOD PRESSURE: 76 MMHG | BODY MASS INDEX: 36.53 KG/M2 | RESPIRATION RATE: 18 BRPM | HEIGHT: 68 IN | SYSTOLIC BLOOD PRESSURE: 134 MMHG | OXYGEN SATURATION: 98 % | HEART RATE: 73 BPM | WEIGHT: 241 LBS

## 2024-11-01 DIAGNOSIS — R05.1 ACUTE COUGH: ICD-10-CM

## 2024-11-01 DIAGNOSIS — Z00.00 WELL ADULT EXAM: ICD-10-CM

## 2024-11-01 DIAGNOSIS — M79.89 LEG SWELLING: ICD-10-CM

## 2024-11-01 DIAGNOSIS — Z13.220 LIPID SCREENING: ICD-10-CM

## 2024-11-01 DIAGNOSIS — Z00.00 WELL ADULT EXAM: Primary | ICD-10-CM

## 2024-11-01 DIAGNOSIS — E55.9 VITAMIN D DEFICIENCY: ICD-10-CM

## 2024-11-01 DIAGNOSIS — Z23 FLU VACCINE NEED: ICD-10-CM

## 2024-11-01 DIAGNOSIS — I10 ESSENTIAL (PRIMARY) HYPERTENSION: ICD-10-CM

## 2024-11-01 DIAGNOSIS — H57.89 EYE REDNESS: ICD-10-CM

## 2024-11-01 DIAGNOSIS — Z87.891 PERSONAL HISTORY OF TOBACCO USE: ICD-10-CM

## 2024-11-01 LAB
BASOPHILS # BLD AUTO: 0 X10E3/UL (ref 0–0.2)
BASOPHILS NFR BLD AUTO: 1 %
EOSINOPHIL # BLD AUTO: 0.1 X10E3/UL (ref 0–0.4)
EOSINOPHIL NFR BLD AUTO: 1 %
ERYTHROCYTE [DISTWIDTH] IN BLOOD BY AUTOMATED COUNT: 11.7 % (ref 11.7–15.4)
HCT VFR BLD AUTO: 35 % (ref 34–46.6)
HGB BLD-MCNC: 11.5 G/DL (ref 11.1–15.9)
IMM GRANULOCYTES # BLD AUTO: 0 X10E3/UL (ref 0–0.1)
IMM GRANULOCYTES NFR BLD AUTO: 1 %
LYMPHOCYTES # BLD AUTO: 1.8 X10E3/UL (ref 0.7–3.1)
LYMPHOCYTES NFR BLD AUTO: 27 %
MCH RBC QN AUTO: 33.2 PG (ref 26.6–33)
MCHC RBC AUTO-ENTMCNC: 32.9 G/DL (ref 31.5–35.7)
MCV RBC AUTO: 101 FL (ref 79–97)
MONOCYTES # BLD AUTO: 0.7 X10E3/UL (ref 0.1–0.9)
MONOCYTES NFR BLD AUTO: 10 %
NEUTROPHILS # BLD AUTO: 4 X10E3/UL (ref 1.4–7)
NEUTROPHILS NFR BLD AUTO: 60 %
PLATELET # BLD AUTO: 172 X10E3/UL (ref 150–450)
RBC # BLD AUTO: 3.46 X10E6/UL (ref 3.77–5.28)
WBC # BLD AUTO: 6.6 X10E3/UL (ref 3.4–10.8)

## 2024-11-01 PROCEDURE — 99396 PREV VISIT EST AGE 40-64: CPT | Performed by: INTERNAL MEDICINE

## 2024-11-01 PROCEDURE — G0296 VISIT TO DETERM LDCT ELIG: HCPCS | Performed by: INTERNAL MEDICINE

## 2024-11-01 PROCEDURE — 90661 CCIIV3 VAC ABX FR 0.5 ML IM: CPT | Performed by: INTERNAL MEDICINE

## 2024-11-01 RX ORDER — HYDROCHLOROTHIAZIDE 25 MG/1
25 TABLET ORAL EVERY MORNING
Qty: 90 TABLET | Refills: 1 | Status: SHIPPED | OUTPATIENT
Start: 2024-11-01

## 2024-11-01 RX ORDER — GUAIFENESIN 600 MG/1
600 TABLET, EXTENDED RELEASE ORAL 2 TIMES DAILY
Qty: 30 TABLET | Refills: 0 | Status: SHIPPED | OUTPATIENT
Start: 2024-11-01 | End: 2024-11-16

## 2024-11-01 RX ORDER — OLOPATADINE HYDROCHLORIDE 2 MG/ML
1 SOLUTION/ DROPS OPHTHALMIC DAILY
Qty: 1 EACH | Refills: 0 | Status: SHIPPED | OUTPATIENT
Start: 2024-11-01

## 2024-11-01 SDOH — ECONOMIC STABILITY: FOOD INSECURITY: WITHIN THE PAST 12 MONTHS, THE FOOD YOU BOUGHT JUST DIDN'T LAST AND YOU DIDN'T HAVE MONEY TO GET MORE.: NEVER TRUE

## 2024-11-01 SDOH — ECONOMIC STABILITY: FOOD INSECURITY: WITHIN THE PAST 12 MONTHS, YOU WORRIED THAT YOUR FOOD WOULD RUN OUT BEFORE YOU GOT MONEY TO BUY MORE.: NEVER TRUE

## 2024-11-01 SDOH — ECONOMIC STABILITY: INCOME INSECURITY: HOW HARD IS IT FOR YOU TO PAY FOR THE VERY BASICS LIKE FOOD, HOUSING, MEDICAL CARE, AND HEATING?: NOT HARD AT ALL

## 2024-11-01 ASSESSMENT — PATIENT HEALTH QUESTIONNAIRE - PHQ9
SUM OF ALL RESPONSES TO PHQ QUESTIONS 1-9: 0
SUM OF ALL RESPONSES TO PHQ QUESTIONS 1-9: 0
2. FEELING DOWN, DEPRESSED OR HOPELESS: NOT AT ALL
SUM OF ALL RESPONSES TO PHQ QUESTIONS 1-9: 0
1. LITTLE INTEREST OR PLEASURE IN DOING THINGS: NOT AT ALL
SUM OF ALL RESPONSES TO PHQ9 QUESTIONS 1 & 2: 0
SUM OF ALL RESPONSES TO PHQ QUESTIONS 1-9: 0

## 2024-11-01 ASSESSMENT — ENCOUNTER SYMPTOMS
GASTROINTESTINAL NEGATIVE: 1
COUGH: 1

## 2024-11-01 NOTE — PROGRESS NOTES
Chief Complaint   Patient presents with    Annual Exam     \"Have you been to the ER, urgent care clinic since your last visit?  Hospitalized since your last visit?\"    NO    “Have you seen or consulted any other health care providers outside our system since your last visit?”    NO     “Have you had a pap smear?”    NO    Date of last Cervical Cancer screen (HPV or PAP): 2/24/2012                
Discussed with the patient the current USPSTF guidelines released March 9, 2021 for screening for lung cancer.    For adults aged 50 to 80 years who have a 20 pack-year smoking history and currently smoke or have quit within the past 15 years the grade B recommendation is to:  Screen for lung cancer with low-dose computed tomography (LDCT) every year.  Stop screening once a person has not smoked for 15 years or has a health problem that limits life expectancy or the ability to have lung surgery.    The patient  reports that she has been smoking cigarettes. She started smoking about 45 years ago. She has a 20 pack-year smoking history. She has never used smokeless tobacco.. Discussed with patient the risks and benefits of screening, including over-diagnosis, false positive rate, and total radiation exposure.  The patient currently exhibits no signs or symptoms suggestive of lung cancer.  Discussed with patient the importance of compliance with yearly annual lung cancer screenings and willingness to undergo diagnosis and treatment if screening scan is positive.  In addition, the patient was counseled regarding the importance of remaining smoke free and/or total smoking cessation.    Also reviewed the following if the patient has Medicare that as of February 10, 2022, Medicare only covers LDCT screening in patients aged 50-77 with at least a 20 pack-year smoking history who currently smoke or have quit in the last 15 years  
Posterior tibial pulses are palpable. No ankle swelling. Posterior lower extremity muscle strength is +5. Homans sign is negative.  Pleasant     Vital Signs  Blood pressure is 134/76.      Assessment & Plan    Assessment & Plan  1. Cough.  The cough could be a result of seasonal changes or a viral infection. Mucinex has been prescribed to be taken every 12 hours to alleviate the cough and thin the secretions. Increased water intake is also recommended.    2. Hypertension/Swelling   A refill of hydrochlorothiazide 25 mg has been provided and sent to [facility] at [address] [place] [address].    3. Dizziness.  The dizziness could be due to dehydration caused by her diuretic medication. She has been advised to increase her water intake to at least 80 ounces per day.    4. Eye irritation.  A prescription for Pataday drops has been sent in. An updated eye exam is recommended.    5. Health Maintenance.  Blood work will be updated to check cholesterol, kidney function, electrolytes, anemia, and vitamin D levels. A low-dose CT screen will be scheduled. An influenza vaccine will be administered today. Her latest Pap smear results will be requested from [facility].        Paula was seen today for annual exam.    Diagnoses and all orders for this visit:    Well adult exam  -     Comprehensive Metabolic Panel; Future  -     CBC with Auto Differential; Future  -     TSH; Future    Personal history of tobacco use  -     HI VISIT TO DISCUSS LUNG CA SCREEN W LDCT  -     CT Lung Screen (Initial/Annual/Baseline); Future    Leg swelling  -     Comprehensive Metabolic Panel; Future  -     CBC with Auto Differential; Future  -     TSH; Future  -     hydroCHLOROthiazide (HYDRODIURIL) 25 MG tablet; Take 1 tablet by mouth every morning    Essential (primary) hypertension  -     Comprehensive Metabolic Panel; Future  -     CBC with Auto Differential; Future  -     TSH; Future  -     hydroCHLOROthiazide (HYDRODIURIL) 25 MG tablet;

## 2024-11-02 LAB
25(OH)D3+25(OH)D2 SERPL-MCNC: 8.3 NG/ML (ref 30–100)
ALBUMIN SERPL-MCNC: 4.2 G/DL (ref 3.8–4.9)
ALP SERPL-CCNC: 73 IU/L (ref 44–121)
ALT SERPL-CCNC: 11 IU/L (ref 0–32)
AST SERPL-CCNC: 17 IU/L (ref 0–40)
BILIRUB SERPL-MCNC: 0.3 MG/DL (ref 0–1.2)
BUN SERPL-MCNC: 21 MG/DL (ref 6–24)
BUN/CREAT SERPL: 31 (ref 9–23)
CALCIUM SERPL-MCNC: 9.2 MG/DL (ref 8.7–10.2)
CHLORIDE SERPL-SCNC: 101 MMOL/L (ref 96–106)
CHOLEST SERPL-MCNC: 140 MG/DL (ref 100–199)
CO2 SERPL-SCNC: 24 MMOL/L (ref 20–29)
CREAT SERPL-MCNC: 0.68 MG/DL (ref 0.57–1)
EGFRCR SERPLBLD CKD-EPI 2021: 102 ML/MIN/1.73
GLOBULIN SER CALC-MCNC: 2.6 G/DL (ref 1.5–4.5)
GLUCOSE SERPL-MCNC: 79 MG/DL (ref 70–99)
HDLC SERPL-MCNC: 50 MG/DL
IMP & REVIEW OF LAB RESULTS: NORMAL
LDLC SERPL CALC-MCNC: 78 MG/DL (ref 0–99)
POTASSIUM SERPL-SCNC: 3.4 MMOL/L (ref 3.5–5.2)
PROT SERPL-MCNC: 6.8 G/DL (ref 6–8.5)
SODIUM SERPL-SCNC: 144 MMOL/L (ref 134–144)
TRIGL SERPL-MCNC: 57 MG/DL (ref 0–149)
TSH SERPL DL<=0.005 MIU/L-ACNC: 1.9 UIU/ML (ref 0.45–4.5)
VLDLC SERPL CALC-MCNC: 12 MG/DL (ref 5–40)

## 2024-11-05 DIAGNOSIS — E55.9 VITAMIN D DEFICIENCY: Primary | ICD-10-CM

## 2024-11-05 RX ORDER — ERGOCALCIFEROL 1.25 MG/1
50000 CAPSULE, LIQUID FILLED ORAL WEEKLY
Qty: 12 CAPSULE | Refills: 1 | Status: SHIPPED | OUTPATIENT
Start: 2024-11-05

## 2024-11-29 ENCOUNTER — HOSPITAL ENCOUNTER (OUTPATIENT)
Facility: HOSPITAL | Age: 59
Discharge: HOME | End: 2024-12-02
Payer: MEDICAID

## 2024-11-29 VITALS — BODY MASS INDEX: 36.53 KG/M2 | WEIGHT: 241 LBS | HEIGHT: 68 IN

## 2024-11-29 DIAGNOSIS — Z87.891 PERSONAL HISTORY OF TOBACCO USE: ICD-10-CM

## 2024-11-29 DIAGNOSIS — Z12.31 VISIT FOR SCREENING MAMMOGRAM: ICD-10-CM

## 2024-11-29 PROCEDURE — 71271 CT THORAX LUNG CANCER SCR C-: CPT

## 2024-11-29 PROCEDURE — 77063 BREAST TOMOSYNTHESIS BI: CPT

## 2024-12-04 ENCOUNTER — PATIENT MESSAGE (OUTPATIENT)
Age: 59
End: 2024-12-04

## 2025-07-10 DIAGNOSIS — I10 ESSENTIAL (PRIMARY) HYPERTENSION: ICD-10-CM

## 2025-07-10 DIAGNOSIS — M79.89 LEG SWELLING: ICD-10-CM

## 2025-07-10 RX ORDER — HYDROCHLOROTHIAZIDE 25 MG/1
25 TABLET ORAL EVERY MORNING
Qty: 30 TABLET | Refills: 0 | Status: SHIPPED | OUTPATIENT
Start: 2025-07-10

## 2025-08-21 DIAGNOSIS — M79.89 LEG SWELLING: ICD-10-CM

## 2025-08-21 DIAGNOSIS — I10 ESSENTIAL (PRIMARY) HYPERTENSION: ICD-10-CM

## 2025-08-22 RX ORDER — HYDROCHLOROTHIAZIDE 25 MG/1
25 TABLET ORAL EVERY MORNING
Qty: 90 TABLET | OUTPATIENT
Start: 2025-08-22

## (undated) DEVICE — CONTAINER SPEC 20 ML LID NEUT BUFF FORMALIN 10 % POLYPR STS

## (undated) DEVICE — SNARE ENDOSCP POLYP MED 2.4 MM 240 CM 27 MM 2.8 MM SHT SENS

## (undated) DEVICE — CATHETER IV 20GA L1.16IN OD1.0414-1.1176MM ID0.762-0.8382MM

## (undated) DEVICE — SYSTEM REPROC CBL 3 LD DISPOSABLE

## (undated) DEVICE — ENDOSCOPIC KIT COMPLIANCE ENDOKIT

## (undated) DEVICE — CUFF BLD PRSS AD CLTH SGL TB W/ BAYNT CONN ROUNDED CORNER

## (undated) DEVICE — IV START KIT: Brand: MEDLINE

## (undated) DEVICE — CLIP LIG L235CM RESOL 360 BX/20